# Patient Record
Sex: FEMALE | Race: WHITE | Employment: FULL TIME | URBAN - METROPOLITAN AREA
[De-identification: names, ages, dates, MRNs, and addresses within clinical notes are randomized per-mention and may not be internally consistent; named-entity substitution may affect disease eponyms.]

---

## 2017-01-24 ENCOUNTER — OFFICE VISIT (OUTPATIENT)
Dept: FAMILY MEDICINE CLINIC | Age: 42
End: 2017-01-24

## 2017-01-24 VITALS — DIASTOLIC BLOOD PRESSURE: 78 MMHG | SYSTOLIC BLOOD PRESSURE: 120 MMHG | WEIGHT: 146 LBS | BODY MASS INDEX: 23.57 KG/M2

## 2017-01-24 DIAGNOSIS — F32.4 MAJOR DEPRESSION SINGLE EPISODE, IN PARTIAL REMISSION (HCC): Primary | ICD-10-CM

## 2017-01-24 DIAGNOSIS — F41.9 ANXIETY: ICD-10-CM

## 2017-01-24 PROCEDURE — 99213 OFFICE O/P EST LOW 20 MIN: CPT | Performed by: FAMILY MEDICINE

## 2017-01-24 RX ORDER — SERTRALINE HYDROCHLORIDE 100 MG/1
TABLET, FILM COATED ORAL
Qty: 30 TABLET | Refills: 5 | Status: SHIPPED | OUTPATIENT
Start: 2017-01-24 | End: 2017-08-07 | Stop reason: SDUPTHER

## 2017-04-25 ENCOUNTER — OFFICE VISIT (OUTPATIENT)
Dept: FAMILY MEDICINE CLINIC | Age: 42
End: 2017-04-25

## 2017-04-25 VITALS
SYSTOLIC BLOOD PRESSURE: 118 MMHG | HEIGHT: 66 IN | WEIGHT: 144 LBS | DIASTOLIC BLOOD PRESSURE: 76 MMHG | BODY MASS INDEX: 23.14 KG/M2

## 2017-04-25 DIAGNOSIS — F41.9 ANXIETY: ICD-10-CM

## 2017-04-25 DIAGNOSIS — Z00.00 PREVENTATIVE HEALTH CARE: ICD-10-CM

## 2017-04-25 DIAGNOSIS — F32.4 MAJOR DEPRESSION SINGLE EPISODE, IN PARTIAL REMISSION (HCC): Primary | ICD-10-CM

## 2017-04-25 PROCEDURE — 99213 OFFICE O/P EST LOW 20 MIN: CPT | Performed by: FAMILY MEDICINE

## 2017-06-14 RX ORDER — ALPRAZOLAM 1 MG/1
TABLET ORAL
Qty: 42 TABLET | Refills: 0 | Status: SHIPPED | OUTPATIENT
Start: 2017-06-14 | End: 2017-07-13 | Stop reason: SDUPTHER

## 2017-07-25 ENCOUNTER — OFFICE VISIT (OUTPATIENT)
Dept: FAMILY MEDICINE CLINIC | Age: 42
End: 2017-07-25

## 2017-07-25 VITALS
SYSTOLIC BLOOD PRESSURE: 120 MMHG | HEIGHT: 66 IN | WEIGHT: 135 LBS | DIASTOLIC BLOOD PRESSURE: 74 MMHG | BODY MASS INDEX: 21.69 KG/M2

## 2017-07-25 DIAGNOSIS — F41.9 ANXIETY: Primary | ICD-10-CM

## 2017-07-25 DIAGNOSIS — Z00.00 PREVENTATIVE HEALTH CARE: ICD-10-CM

## 2017-07-25 PROCEDURE — 99213 OFFICE O/P EST LOW 20 MIN: CPT | Performed by: FAMILY MEDICINE

## 2017-08-08 RX ORDER — SERTRALINE HYDROCHLORIDE 100 MG/1
TABLET, FILM COATED ORAL
Qty: 30 TABLET | Refills: 2 | Status: SHIPPED | OUTPATIENT
Start: 2017-08-08 | End: 2017-10-13

## 2017-08-17 RX ORDER — ALPRAZOLAM 1 MG/1
TABLET ORAL
Qty: 42 TABLET | Refills: 0 | Status: SHIPPED | OUTPATIENT
Start: 2017-08-17 | End: 2017-09-22 | Stop reason: SDUPTHER

## 2017-10-16 NOTE — PROGRESS NOTES
Subjective:      Patient ID: Judith Chambers is a 43 y.o. female. HPI     Depression: Pt takes Zoloft 100 mg daily. She feels that the medication is helping with her symptoms. She is sleeping well (with OTC Unisom) and denies any suicidal ideation. She feels that she still needs to be on the medication.       Anxiety:  Pt takes Xanax 1 mg every 8 hrs as needed. She generally just takes it 1 times daily and feels that it works well when taken. Review of Systems  /78 (Site: Left Arm)   Ht 5' 6\" (1.676 m)   Wt 138 lb (62.6 kg)   BMI 22.27 kg/m²    Objective:   Physical Exam   Constitutional: She is oriented to person, place, and time. She appears well-developed and well-nourished. No distress. HENT:   Head: Normocephalic. Right Ear: External ear normal.   Left Ear: External ear normal.   Mouth/Throat: Oropharynx is clear and moist. No oropharyngeal exudate. Neck: No JVD present. No thyromegaly present. Cardiovascular: Normal rate, regular rhythm and normal heart sounds. No murmur heard. Pulmonary/Chest: Effort normal and breath sounds normal. She has no wheezes. She has no rales. Musculoskeletal: She exhibits no edema. Lymphadenopathy:     She has no cervical adenopathy. Neurological: She is alert and oriented to person, place, and time. Assessment:      Depression  Anxiety      Plan:      OARRS report was reviewed  Medications refilled   Flu Shot Given  Reminded patient to have a GYN exam  RTO 3 months for Anxiety    Controlled Substances Monitoring:     Attestation: The Prescription Monitoring Report for this patient was reviewed today. Jamison Calix DO)  Documentation: No signs of potential drug abuse or diversion identified., Existing medication contract.  Jamison Calix DO)

## 2017-10-17 ENCOUNTER — OFFICE VISIT (OUTPATIENT)
Dept: FAMILY MEDICINE CLINIC | Age: 42
End: 2017-10-17

## 2017-10-17 VITALS
WEIGHT: 138 LBS | SYSTOLIC BLOOD PRESSURE: 118 MMHG | HEIGHT: 66 IN | BODY MASS INDEX: 22.18 KG/M2 | DIASTOLIC BLOOD PRESSURE: 78 MMHG

## 2017-10-17 DIAGNOSIS — Z23 NEED FOR INFLUENZA VACCINATION: ICD-10-CM

## 2017-10-17 DIAGNOSIS — F41.9 ANXIETY: ICD-10-CM

## 2017-10-17 DIAGNOSIS — F32.4 MAJOR DEPRESSION SINGLE EPISODE, IN PARTIAL REMISSION (HCC): Primary | ICD-10-CM

## 2017-10-17 PROCEDURE — 90686 IIV4 VACC NO PRSV 0.5 ML IM: CPT | Performed by: FAMILY MEDICINE

## 2017-10-17 PROCEDURE — 99213 OFFICE O/P EST LOW 20 MIN: CPT | Performed by: FAMILY MEDICINE

## 2017-10-17 PROCEDURE — 90471 IMMUNIZATION ADMIN: CPT | Performed by: FAMILY MEDICINE

## 2017-10-17 NOTE — PROGRESS NOTES
Vaccine Information Sheet, \"Influenza - Inactivated\"  given to Jennifer Castro, or parent/legal guardian of  Jennifer Castro and verbalized understanding. Patient responses:    Have you ever had a reaction to a flu vaccine? No  Are you able to eat eggs without adverse effects? Yes  Do you have any current illness? No  Have you ever had Guillian [place] Syndrome? No    Flu vaccine given per order. Please see immunization tab.

## 2017-12-06 RX ORDER — ALPRAZOLAM 1 MG/1
TABLET ORAL
Qty: 42 TABLET | Refills: 0 | Status: SHIPPED | OUTPATIENT
Start: 2017-12-06 | End: 2018-01-11 | Stop reason: SDUPTHER

## 2018-01-11 RX ORDER — ALPRAZOLAM 1 MG/1
TABLET ORAL
Qty: 42 TABLET | Refills: 0 | Status: SHIPPED | OUTPATIENT
Start: 2018-01-11 | End: 2018-02-15 | Stop reason: SDUPTHER

## 2018-01-16 ENCOUNTER — OFFICE VISIT (OUTPATIENT)
Dept: FAMILY MEDICINE CLINIC | Age: 43
End: 2018-01-16

## 2018-01-16 VITALS
DIASTOLIC BLOOD PRESSURE: 74 MMHG | BODY MASS INDEX: 22.34 KG/M2 | WEIGHT: 139 LBS | HEIGHT: 66 IN | SYSTOLIC BLOOD PRESSURE: 118 MMHG

## 2018-01-16 DIAGNOSIS — F41.9 ANXIETY: Primary | ICD-10-CM

## 2018-01-16 PROCEDURE — 99213 OFFICE O/P EST LOW 20 MIN: CPT | Performed by: FAMILY MEDICINE

## 2018-02-15 RX ORDER — ALPRAZOLAM 1 MG/1
TABLET ORAL
Qty: 42 TABLET | Refills: 0 | Status: SHIPPED | OUTPATIENT
Start: 2018-02-15 | End: 2018-03-22 | Stop reason: SDUPTHER

## 2018-03-14 RX ORDER — SERTRALINE HYDROCHLORIDE 100 MG/1
TABLET, FILM COATED ORAL
Qty: 30 TABLET | Refills: 0 | Status: SHIPPED | OUTPATIENT
Start: 2018-03-14 | End: 2018-04-10 | Stop reason: SDUPTHER

## 2018-03-22 RX ORDER — ALPRAZOLAM 1 MG/1
TABLET ORAL
Qty: 42 TABLET | Refills: 0 | Status: SHIPPED | OUTPATIENT
Start: 2018-03-22 | End: 2018-04-23 | Stop reason: SDUPTHER

## 2018-04-17 ENCOUNTER — OFFICE VISIT (OUTPATIENT)
Dept: FAMILY MEDICINE CLINIC | Age: 43
End: 2018-04-17

## 2018-04-17 VITALS — BODY MASS INDEX: 23.24 KG/M2 | SYSTOLIC BLOOD PRESSURE: 120 MMHG | DIASTOLIC BLOOD PRESSURE: 76 MMHG | WEIGHT: 144 LBS

## 2018-04-17 DIAGNOSIS — F32.4 MAJOR DEPRESSION SINGLE EPISODE, IN PARTIAL REMISSION (HCC): Primary | ICD-10-CM

## 2018-04-17 DIAGNOSIS — Z00.00 PREVENTATIVE HEALTH CARE: ICD-10-CM

## 2018-04-17 DIAGNOSIS — F41.9 ANXIETY: ICD-10-CM

## 2018-04-17 LAB
ANION GAP SERPL CALCULATED.3IONS-SCNC: 17 MMOL/L (ref 3–16)
BUN BLDV-MCNC: 15 MG/DL (ref 7–20)
CALCIUM SERPL-MCNC: 9.4 MG/DL (ref 8.3–10.6)
CHLORIDE BLD-SCNC: 100 MMOL/L (ref 99–110)
CHOLESTEROL, TOTAL: 229 MG/DL (ref 0–199)
CO2: 24 MMOL/L (ref 21–32)
CREAT SERPL-MCNC: 0.6 MG/DL (ref 0.6–1.1)
GFR AFRICAN AMERICAN: >60
GFR NON-AFRICAN AMERICAN: >60
GLUCOSE BLD-MCNC: 81 MG/DL (ref 70–99)
HDLC SERPL-MCNC: 93 MG/DL (ref 40–60)
LDL CHOLESTEROL CALCULATED: 107 MG/DL
POTASSIUM SERPL-SCNC: 4.5 MMOL/L (ref 3.5–5.1)
SODIUM BLD-SCNC: 141 MMOL/L (ref 136–145)
TRIGL SERPL-MCNC: 143 MG/DL (ref 0–150)
VLDLC SERPL CALC-MCNC: 29 MG/DL

## 2018-04-17 PROCEDURE — 36415 COLL VENOUS BLD VENIPUNCTURE: CPT | Performed by: FAMILY MEDICINE

## 2018-04-17 PROCEDURE — 99214 OFFICE O/P EST MOD 30 MIN: CPT | Performed by: FAMILY MEDICINE

## 2018-04-17 ASSESSMENT — PATIENT HEALTH QUESTIONNAIRE - PHQ9
2. FEELING DOWN, DEPRESSED OR HOPELESS: 0
SUM OF ALL RESPONSES TO PHQ QUESTIONS 1-9: 0
SUM OF ALL RESPONSES TO PHQ9 QUESTIONS 1 & 2: 0
1. LITTLE INTEREST OR PLEASURE IN DOING THINGS: 0

## 2018-04-23 RX ORDER — ALPRAZOLAM 1 MG/1
TABLET ORAL
Qty: 42 TABLET | Refills: 0 | Status: SHIPPED | OUTPATIENT
Start: 2018-04-23 | End: 2018-05-23 | Stop reason: SDUPTHER

## 2018-05-24 RX ORDER — ALPRAZOLAM 1 MG/1
TABLET ORAL
Qty: 42 TABLET | Refills: 0 | Status: SHIPPED | OUTPATIENT
Start: 2018-05-24 | End: 2018-06-29 | Stop reason: SDUPTHER

## 2018-07-17 ENCOUNTER — OFFICE VISIT (OUTPATIENT)
Dept: FAMILY MEDICINE CLINIC | Age: 43
End: 2018-07-17

## 2018-07-17 VITALS
BODY MASS INDEX: 22.5 KG/M2 | DIASTOLIC BLOOD PRESSURE: 72 MMHG | WEIGHT: 140 LBS | SYSTOLIC BLOOD PRESSURE: 113 MMHG | HEIGHT: 66 IN | HEART RATE: 88 BPM

## 2018-07-17 DIAGNOSIS — F41.9 ANXIETY: Primary | ICD-10-CM

## 2018-07-17 PROCEDURE — 99213 OFFICE O/P EST LOW 20 MIN: CPT | Performed by: FAMILY MEDICINE

## 2018-08-06 DIAGNOSIS — F41.9 ANXIETY: ICD-10-CM

## 2018-08-06 RX ORDER — ALPRAZOLAM 1 MG/1
TABLET ORAL
Qty: 42 TABLET | Refills: 0 | Status: SHIPPED | OUTPATIENT
Start: 2018-08-06 | End: 2018-09-04 | Stop reason: SDUPTHER

## 2018-09-04 DIAGNOSIS — F41.9 ANXIETY: ICD-10-CM

## 2018-09-04 RX ORDER — ALPRAZOLAM 1 MG/1
TABLET ORAL
Qty: 42 TABLET | Refills: 0 | Status: SHIPPED | OUTPATIENT
Start: 2018-09-04 | End: 2018-10-02 | Stop reason: SDUPTHER

## 2018-10-02 DIAGNOSIS — F41.9 ANXIETY: ICD-10-CM

## 2018-10-02 RX ORDER — ALPRAZOLAM 1 MG/1
TABLET ORAL
Qty: 42 TABLET | Refills: 0 | Status: SHIPPED | OUTPATIENT
Start: 2018-10-02 | End: 2018-11-02 | Stop reason: SDUPTHER

## 2018-10-17 ENCOUNTER — OFFICE VISIT (OUTPATIENT)
Dept: FAMILY MEDICINE CLINIC | Age: 43
End: 2018-10-17
Payer: COMMERCIAL

## 2018-10-17 VITALS — DIASTOLIC BLOOD PRESSURE: 78 MMHG | WEIGHT: 146 LBS | SYSTOLIC BLOOD PRESSURE: 120 MMHG | BODY MASS INDEX: 23.57 KG/M2

## 2018-10-17 DIAGNOSIS — F32.4 MAJOR DEPRESSION SINGLE EPISODE, IN PARTIAL REMISSION (HCC): Primary | ICD-10-CM

## 2018-10-17 DIAGNOSIS — F41.9 ANXIETY: ICD-10-CM

## 2018-10-17 DIAGNOSIS — Z23 NEED FOR INFLUENZA VACCINATION: ICD-10-CM

## 2018-10-17 PROCEDURE — 90471 IMMUNIZATION ADMIN: CPT | Performed by: FAMILY MEDICINE

## 2018-10-17 PROCEDURE — 90686 IIV4 VACC NO PRSV 0.5 ML IM: CPT | Performed by: FAMILY MEDICINE

## 2018-10-17 PROCEDURE — 99213 OFFICE O/P EST LOW 20 MIN: CPT | Performed by: FAMILY MEDICINE

## 2018-11-02 DIAGNOSIS — F41.9 ANXIETY: ICD-10-CM

## 2018-11-02 RX ORDER — ALPRAZOLAM 1 MG/1
TABLET ORAL
Qty: 42 TABLET | Refills: 0 | Status: SHIPPED | OUTPATIENT
Start: 2018-11-02 | End: 2018-12-10 | Stop reason: SDUPTHER

## 2018-12-10 DIAGNOSIS — F41.9 ANXIETY: ICD-10-CM

## 2018-12-10 RX ORDER — ALPRAZOLAM 1 MG/1
TABLET ORAL
Qty: 42 TABLET | Refills: 0 | Status: SHIPPED | OUTPATIENT
Start: 2018-12-10 | End: 2019-01-17 | Stop reason: SDUPTHER

## 2018-12-10 RX ORDER — SERTRALINE HYDROCHLORIDE 100 MG/1
TABLET, FILM COATED ORAL
Qty: 30 TABLET | Refills: 0 | Status: SHIPPED | OUTPATIENT
Start: 2018-12-10 | End: 2019-01-02 | Stop reason: SDUPTHER

## 2019-01-17 ENCOUNTER — OFFICE VISIT (OUTPATIENT)
Dept: FAMILY MEDICINE CLINIC | Age: 44
End: 2019-01-17
Payer: COMMERCIAL

## 2019-01-17 VITALS — WEIGHT: 148 LBS | DIASTOLIC BLOOD PRESSURE: 80 MMHG | SYSTOLIC BLOOD PRESSURE: 120 MMHG | BODY MASS INDEX: 23.89 KG/M2

## 2019-01-17 DIAGNOSIS — F41.9 ANXIETY: ICD-10-CM

## 2019-01-17 PROCEDURE — 99213 OFFICE O/P EST LOW 20 MIN: CPT | Performed by: FAMILY MEDICINE

## 2019-01-17 RX ORDER — ALPRAZOLAM 1 MG/1
TABLET ORAL
Qty: 42 TABLET | Refills: 0 | Status: SHIPPED | OUTPATIENT
Start: 2019-01-17 | End: 2019-02-19 | Stop reason: SDUPTHER

## 2019-02-19 DIAGNOSIS — F41.9 ANXIETY: ICD-10-CM

## 2019-02-19 RX ORDER — ALPRAZOLAM 1 MG/1
TABLET ORAL
Qty: 42 TABLET | Refills: 0 | Status: SHIPPED | OUTPATIENT
Start: 2019-02-19 | End: 2019-03-18 | Stop reason: SDUPTHER

## 2019-03-18 DIAGNOSIS — F41.9 ANXIETY: ICD-10-CM

## 2019-03-18 RX ORDER — ALPRAZOLAM 1 MG/1
TABLET ORAL
Qty: 42 TABLET | Refills: 0 | Status: SHIPPED | OUTPATIENT
Start: 2019-03-18 | End: 2019-04-16 | Stop reason: SDUPTHER

## 2019-04-15 RX ORDER — SERTRALINE HYDROCHLORIDE 100 MG/1
TABLET, FILM COATED ORAL
Qty: 30 TABLET | Refills: 0 | Status: SHIPPED | OUTPATIENT
Start: 2019-04-15 | End: 2019-05-18 | Stop reason: SDUPTHER

## 2019-04-16 ENCOUNTER — OFFICE VISIT (OUTPATIENT)
Dept: FAMILY MEDICINE CLINIC | Age: 44
End: 2019-04-16
Payer: COMMERCIAL

## 2019-04-16 VITALS
WEIGHT: 145.6 LBS | BODY MASS INDEX: 23.4 KG/M2 | DIASTOLIC BLOOD PRESSURE: 70 MMHG | HEIGHT: 66 IN | SYSTOLIC BLOOD PRESSURE: 110 MMHG

## 2019-04-16 DIAGNOSIS — F41.9 ANXIETY: ICD-10-CM

## 2019-04-16 DIAGNOSIS — F32.4 MAJOR DEPRESSION SINGLE EPISODE, IN PARTIAL REMISSION (HCC): Primary | ICD-10-CM

## 2019-04-16 PROCEDURE — 99213 OFFICE O/P EST LOW 20 MIN: CPT | Performed by: FAMILY MEDICINE

## 2019-04-16 RX ORDER — ALPRAZOLAM 1 MG/1
TABLET ORAL
Qty: 42 TABLET | Refills: 0 | Status: SHIPPED | OUTPATIENT
Start: 2019-04-16 | End: 2019-05-14 | Stop reason: SDUPTHER

## 2019-05-14 DIAGNOSIS — F41.9 ANXIETY: ICD-10-CM

## 2019-05-14 RX ORDER — ALPRAZOLAM 1 MG/1
TABLET ORAL
Qty: 42 TABLET | Refills: 0 | Status: SHIPPED | OUTPATIENT
Start: 2019-05-14 | End: 2019-06-10 | Stop reason: SDUPTHER

## 2019-06-10 DIAGNOSIS — F41.9 ANXIETY: ICD-10-CM

## 2019-06-10 RX ORDER — ALPRAZOLAM 1 MG/1
TABLET ORAL
Qty: 42 TABLET | Refills: 0 | Status: SHIPPED | OUTPATIENT
Start: 2019-06-10 | End: 2019-07-08 | Stop reason: SDUPTHER

## 2019-07-08 DIAGNOSIS — F41.9 ANXIETY: ICD-10-CM

## 2019-07-08 RX ORDER — ALPRAZOLAM 1 MG/1
TABLET ORAL
Qty: 42 TABLET | Refills: 0 | Status: SHIPPED | OUTPATIENT
Start: 2019-07-08 | End: 2019-07-30 | Stop reason: SDUPTHER

## 2019-07-16 ENCOUNTER — OFFICE VISIT (OUTPATIENT)
Dept: FAMILY MEDICINE CLINIC | Age: 44
End: 2019-07-16
Payer: COMMERCIAL

## 2019-07-16 VITALS
BODY MASS INDEX: 23.4 KG/M2 | WEIGHT: 145 LBS | HEART RATE: 92 BPM | DIASTOLIC BLOOD PRESSURE: 78 MMHG | OXYGEN SATURATION: 96 % | TEMPERATURE: 98.2 F | SYSTOLIC BLOOD PRESSURE: 116 MMHG

## 2019-07-16 DIAGNOSIS — F32.4 MAJOR DEPRESSION SINGLE EPISODE, IN PARTIAL REMISSION (HCC): Primary | ICD-10-CM

## 2019-07-16 DIAGNOSIS — F41.9 ANXIETY: ICD-10-CM

## 2019-07-16 PROCEDURE — 99213 OFFICE O/P EST LOW 20 MIN: CPT | Performed by: FAMILY MEDICINE

## 2019-07-30 DIAGNOSIS — F41.9 ANXIETY: ICD-10-CM

## 2019-07-30 RX ORDER — ALPRAZOLAM 1 MG/1
TABLET ORAL
Qty: 42 TABLET | Refills: 0 | Status: SHIPPED | OUTPATIENT
Start: 2019-07-30 | End: 2019-08-26 | Stop reason: SDUPTHER

## 2019-08-26 DIAGNOSIS — F41.9 ANXIETY: ICD-10-CM

## 2019-08-26 RX ORDER — ALPRAZOLAM 1 MG/1
TABLET ORAL
Qty: 42 TABLET | Refills: 0 | Status: SHIPPED | OUTPATIENT
Start: 2019-08-26 | End: 2019-09-23 | Stop reason: SDUPTHER

## 2019-09-23 DIAGNOSIS — F41.9 ANXIETY: ICD-10-CM

## 2019-09-23 RX ORDER — ALPRAZOLAM 1 MG/1
TABLET ORAL
Qty: 42 TABLET | Refills: 0 | Status: SHIPPED | OUTPATIENT
Start: 2019-09-23 | End: 2019-10-22 | Stop reason: SDUPTHER

## 2019-10-22 ENCOUNTER — OFFICE VISIT (OUTPATIENT)
Dept: FAMILY MEDICINE CLINIC | Age: 44
End: 2019-10-22
Payer: COMMERCIAL

## 2019-10-22 VITALS
WEIGHT: 147 LBS | BODY MASS INDEX: 23.63 KG/M2 | SYSTOLIC BLOOD PRESSURE: 114 MMHG | HEIGHT: 66 IN | DIASTOLIC BLOOD PRESSURE: 80 MMHG

## 2019-10-22 DIAGNOSIS — F41.9 ANXIETY: Primary | ICD-10-CM

## 2019-10-22 DIAGNOSIS — Z23 NEED FOR INFLUENZA VACCINATION: ICD-10-CM

## 2019-10-22 PROCEDURE — 90471 IMMUNIZATION ADMIN: CPT | Performed by: FAMILY MEDICINE

## 2019-10-22 PROCEDURE — 90686 IIV4 VACC NO PRSV 0.5 ML IM: CPT | Performed by: FAMILY MEDICINE

## 2019-10-22 PROCEDURE — 99213 OFFICE O/P EST LOW 20 MIN: CPT | Performed by: FAMILY MEDICINE

## 2019-10-22 RX ORDER — ALPRAZOLAM 1 MG/1
1 TABLET ORAL 3 TIMES DAILY PRN
Qty: 42 TABLET | Refills: 0 | Status: SHIPPED | OUTPATIENT
Start: 2019-10-22 | End: 2019-11-14 | Stop reason: SDUPTHER

## 2019-12-06 DIAGNOSIS — F41.9 ANXIETY: ICD-10-CM

## 2019-12-06 RX ORDER — ALPRAZOLAM 1 MG/1
TABLET ORAL
Qty: 42 TABLET | Refills: 0 | Status: SHIPPED | OUTPATIENT
Start: 2019-12-06 | End: 2020-01-02

## 2020-01-02 RX ORDER — ALPRAZOLAM 1 MG/1
TABLET ORAL
Qty: 42 TABLET | Refills: 0 | Status: SHIPPED | OUTPATIENT
Start: 2020-01-02 | End: 2020-01-28

## 2020-01-17 RX ORDER — SERTRALINE HYDROCHLORIDE 100 MG/1
TABLET, FILM COATED ORAL
Qty: 30 TABLET | Refills: 2 | Status: SHIPPED | OUTPATIENT
Start: 2020-01-17 | End: 2020-04-08

## 2020-01-20 ENCOUNTER — OFFICE VISIT (OUTPATIENT)
Dept: FAMILY MEDICINE CLINIC | Age: 45
End: 2020-01-20
Payer: COMMERCIAL

## 2020-01-20 VITALS
HEIGHT: 66 IN | BODY MASS INDEX: 25.07 KG/M2 | WEIGHT: 156 LBS | SYSTOLIC BLOOD PRESSURE: 118 MMHG | DIASTOLIC BLOOD PRESSURE: 78 MMHG

## 2020-01-20 PROCEDURE — 99213 OFFICE O/P EST LOW 20 MIN: CPT | Performed by: FAMILY MEDICINE

## 2020-02-19 RX ORDER — ALPRAZOLAM 1 MG/1
TABLET ORAL
Qty: 42 TABLET | Refills: 0 | Status: SHIPPED | OUTPATIENT
Start: 2020-02-19 | End: 2020-03-12

## 2020-03-12 RX ORDER — ALPRAZOLAM 1 MG/1
TABLET ORAL
Qty: 42 TABLET | Refills: 0 | Status: SHIPPED | OUTPATIENT
Start: 2020-03-12 | End: 2020-04-14 | Stop reason: SDUPTHER

## 2020-04-14 ENCOUNTER — TELEMEDICINE (OUTPATIENT)
Dept: FAMILY MEDICINE CLINIC | Age: 45
End: 2020-04-14
Payer: COMMERCIAL

## 2020-04-14 PROCEDURE — 99213 OFFICE O/P EST LOW 20 MIN: CPT | Performed by: FAMILY MEDICINE

## 2020-04-14 RX ORDER — ALPRAZOLAM 1 MG/1
TABLET ORAL
Qty: 42 TABLET | Refills: 0 | Status: SHIPPED | OUTPATIENT
Start: 2020-04-14 | End: 2020-05-19

## 2020-05-19 RX ORDER — ALPRAZOLAM 1 MG/1
TABLET ORAL
Qty: 42 TABLET | Refills: 0 | Status: SHIPPED | OUTPATIENT
Start: 2020-05-19 | End: 2020-06-15

## 2020-06-15 RX ORDER — ALPRAZOLAM 1 MG/1
TABLET ORAL
Qty: 42 TABLET | Refills: 0 | Status: SHIPPED | OUTPATIENT
Start: 2020-06-15 | End: 2020-07-14 | Stop reason: SDUPTHER

## 2020-07-13 NOTE — PROGRESS NOTES
Subjective:      Patient ID: Rudy Langley is a 39 y.o. female. HPI TELEHEALTH EVALUATION -- Audio/Visual (During DZQEP-47 public health emergency)     Pursuant to the emergency declaration under the [address] [place] [address] and the [employer] Act, this Virtual  Visit was conducted, with patient's consent, to reduce the patient's risk of exposure to COVID-19 and provide continuity of care for an established patient. Services were provided through a video synchronous discussion virtually to substitute for in-person clinic visit. Rudy Langley is a 39 y.o. female being evaluated by a Virtual Visit (video visit) encounter to address concerns as mentioned above. A caregiver was present when appropriate. Due to this being a TeleHealth encounter (During MZZNB-89 public health emergency), evaluation of the following organ systems was limited: Vitals/Constitutional/EENT/Resp/CV/GI//MS/Neuro/Skin/Heme-Lymph-Imm. Pursuant to the emergency declaration under the [address] [place] [address] and the [employer] Act, this Virtual Visit was conducted with patient's (and/or legal guardian's) consent, to reduce the patient's risk of exposure to COVID-19 and provide necessary medical care. The patient (and/or legal guardian) has also been advised to contact this office for worsening conditions or problems, and seek emergency medical treatment and/or call 911 if deemed necessary. Services were provided through a video synchronous discussion virtually to substitute for in-person clinic visit. Patient and provider were located at their individual homes. --MILVIA PELAYO DO on 7/13/2020 at 6:00 AM    An electronic signature was used to authenticate this note.       Depression: Patient takes Zoloft 100 mg daily. Juan Giron

## 2020-07-14 ENCOUNTER — TELEMEDICINE (OUTPATIENT)
Dept: FAMILY MEDICINE CLINIC | Age: 45
End: 2020-07-14
Payer: COMMERCIAL

## 2020-07-14 PROCEDURE — 99213 OFFICE O/P EST LOW 20 MIN: CPT | Performed by: FAMILY MEDICINE

## 2020-07-14 RX ORDER — SERTRALINE HYDROCHLORIDE 100 MG/1
TABLET, FILM COATED ORAL
Qty: 30 TABLET | Refills: 5 | Status: SHIPPED | OUTPATIENT
Start: 2020-07-14 | End: 2020-10-13 | Stop reason: SDUPTHER

## 2020-07-14 RX ORDER — ALPRAZOLAM 1 MG/1
1 TABLET ORAL 3 TIMES DAILY PRN
Qty: 42 TABLET | Refills: 0 | Status: SHIPPED | OUTPATIENT
Start: 2020-07-14 | End: 2020-08-17

## 2020-08-17 RX ORDER — ALPRAZOLAM 1 MG/1
TABLET ORAL
Qty: 42 TABLET | Refills: 0 | Status: SHIPPED | OUTPATIENT
Start: 2020-08-17 | End: 2020-09-14

## 2020-09-14 RX ORDER — ALPRAZOLAM 1 MG/1
TABLET ORAL
Qty: 42 TABLET | Refills: 0 | Status: SHIPPED | OUTPATIENT
Start: 2020-09-14 | End: 2020-10-13 | Stop reason: SDUPTHER

## 2020-10-12 NOTE — PROGRESS NOTES
Subjective:      Patient ID: Dustin Orta is a 39 y.o. female. HPI     Depression: Patient takes Zoloft 100 mg daily.  She feels that the medication is helping with her symptoms but has been somewhat more depressed over the last couple weeks. Be Valencia is sleeping well (with OTC Unisom) and denies any suicidal ideation.  She feels that she still needs to be on the medication.        Anxiety:  Patient takes Xanax 1 mg every 8 hrs as needed.  She generally just takes it 1 time daily and feels that it works well when taken.      Review of Systems   Constitutional: Negative for chills and fever. Psychiatric/Behavioral: Positive for dysphoric mood. Negative for agitation, decreased concentration, sleep disturbance and suicidal ideas. The patient is nervous/anxious. /70 (Site: Left Upper Arm)   Temp 98.4 °F (36.9 °C) (Infrared)   Ht 5' 6\" (1.676 m)   Wt 158 lb (71.7 kg)   BMI 25.50 kg/m²    Objective:   Physical Exam  Constitutional:       General: She is not in acute distress. Appearance: She is well-developed. HENT:      Head: Normocephalic. Right Ear: External ear normal.      Left Ear: External ear normal.      Mouth/Throat:      Pharynx: No oropharyngeal exudate. Neck:      Thyroid: No thyromegaly. Vascular: No JVD. Cardiovascular:      Rate and Rhythm: Normal rate and regular rhythm. Heart sounds: Normal heart sounds. No murmur. Pulmonary:      Effort: Pulmonary effort is normal.      Breath sounds: Normal breath sounds. No wheezing or rales. Lymphadenopathy:      Cervical: No cervical adenopathy. Neurological:      Mental Status: She is alert and oriented to person, place, and time. Assessment:      Depression   Anxiety       Plan:       Increase the Zoloft to 150 mg daily.     OARRS report was reviewed  Medications refilled   Flu Shot Given  Reminded patient to have a GYN exam  RTO 3 months for Depression / Anxiety       Controlled Substance Monitoring:    Acute and Chronic Pain Monitoring:   RX Monitoring 10/13/2020   Attestation -   Periodic Controlled Substance Monitoring No signs of potential drug abuse or diversion identified.               [id number] Ros[address], DO

## 2020-10-13 ENCOUNTER — OFFICE VISIT (OUTPATIENT)
Dept: FAMILY MEDICINE CLINIC | Age: 45
End: 2020-10-13
Payer: COMMERCIAL

## 2020-10-13 VITALS
TEMPERATURE: 98.4 F | WEIGHT: 158 LBS | HEIGHT: 66 IN | DIASTOLIC BLOOD PRESSURE: 70 MMHG | BODY MASS INDEX: 25.39 KG/M2 | SYSTOLIC BLOOD PRESSURE: 110 MMHG

## 2020-10-13 PROCEDURE — 99213 OFFICE O/P EST LOW 20 MIN: CPT | Performed by: FAMILY MEDICINE

## 2020-10-13 PROCEDURE — 90471 IMMUNIZATION ADMIN: CPT | Performed by: FAMILY MEDICINE

## 2020-10-13 PROCEDURE — 90686 IIV4 VACC NO PRSV 0.5 ML IM: CPT | Performed by: FAMILY MEDICINE

## 2020-10-13 RX ORDER — SERTRALINE HYDROCHLORIDE 100 MG/1
150 TABLET, FILM COATED ORAL DAILY
Qty: 45 TABLET | Refills: 5 | Status: SHIPPED | OUTPATIENT
Start: 2020-10-13 | End: 2021-01-11 | Stop reason: SDUPTHER

## 2020-10-13 RX ORDER — ALPRAZOLAM 1 MG/1
TABLET ORAL
Qty: 42 TABLET | Refills: 0 | Status: SHIPPED | OUTPATIENT
Start: 2020-10-13 | End: 2020-11-09

## 2020-10-13 NOTE — PROGRESS NOTES
Vaccine Information Sheet, \"Influenza - Inactivated\"  given to Daija Ross, or parent/legal guardian of  Daija Ross and verbalized understanding. Patient responses:    Have you ever had a reaction to a flu vaccine? No  Do you have any current illness? No  Have you ever had Guillian [place] Syndrome? No  Do you have a serious allergy to any of the follow: Neomycin, Polymyxin, Thimerosal, eggs or egg products? No    Flu vaccine given per order. Please see immunization tab. Risks and benefits explained. Current VIS given.

## 2020-11-09 RX ORDER — ALPRAZOLAM 1 MG/1
TABLET ORAL
Qty: 42 TABLET | Refills: 0 | Status: SHIPPED | OUTPATIENT
Start: 2020-11-09 | End: 2020-12-03

## 2020-12-03 RX ORDER — ALPRAZOLAM 1 MG/1
TABLET ORAL
Qty: 42 TABLET | Refills: 0 | Status: SHIPPED | OUTPATIENT
Start: 2020-12-03 | End: 2020-12-30 | Stop reason: SDUPTHER

## 2020-12-30 RX ORDER — ALPRAZOLAM 1 MG/1
1 TABLET ORAL 3 TIMES DAILY PRN
Qty: 42 TABLET | Refills: 0 | Status: SHIPPED | OUTPATIENT
Start: 2020-12-30 | End: 2021-01-29 | Stop reason: SDUPTHER

## 2021-01-11 RX ORDER — SERTRALINE HYDROCHLORIDE 100 MG/1
150 TABLET, FILM COATED ORAL DAILY
Qty: 45 TABLET | Refills: 0 | Status: SHIPPED | OUTPATIENT
Start: 2021-01-11 | End: 2021-02-11

## 2021-01-12 NOTE — PROGRESS NOTES
Subjective:      Patient ID: Mimi Boxer is a 39 y.o. female. HPI  TELEHEALTH EVALUATION -- Audio/Visual (During JMJH-77 public health emergency)     Pursuant to the emergency declaration under the [address] [place] [address] and the [employer] Act, this Virtual  Visit was conducted, with patient's consent, to reduce the patient's risk of exposure to COVID-19 and provide continuity of care for an established patient. Services were provided through a video synchronous discussion virtually to substitute for in-person clinic visit. Mimi Boxer is a 39 y.o. female being evaluated by a Virtual Visit (video visit) encounter to address concerns as mentioned above. A caregiver was present when appropriate. Due to this being a TeleHealth encounter (During [facility]-45 public health emergency), evaluation of the following organ systems was limited: Vitals/Constitutional/EENT/Resp/CV/GI//MS/Neuro/Skin/Heme-Lymph-Imm. Pursuant to the emergency declaration under the [address] [place] [address], [address] [place] [address] and the [employer] Act, this Virtual Visit was conducted with patient's (and/or legal guardian's) consent, to reduce the patient's risk of exposure to COVID-19 and provide necessary medical care. The patient (and/or legal guardian) has also been advised to contact this office for worsening conditions or problems, and seek emergency medical treatment and/or call 911 if deemed necessary. Services were provided through a video synchronous discussion virtually to substitute for in-person clinic visit. Patient and provider were located at their individual homes. --Garth Mcnulty DO on 1/12/2021 at 5:37 AM    An electronic signature was used to authenticate this note.        Depression: Patient takes Zoloft 150 mg daily. Keene Hodgkin

## 2021-01-13 ENCOUNTER — VIRTUAL VISIT (OUTPATIENT)
Dept: FAMILY MEDICINE CLINIC | Age: 46
End: 2021-01-13
Payer: COMMERCIAL

## 2021-01-13 DIAGNOSIS — F32.4 MAJOR DEPRESSION SINGLE EPISODE, IN PARTIAL REMISSION (HCC): Primary | ICD-10-CM

## 2021-01-13 DIAGNOSIS — F41.9 ANXIETY: ICD-10-CM

## 2021-01-13 PROCEDURE — 99213 OFFICE O/P EST LOW 20 MIN: CPT | Performed by: FAMILY MEDICINE

## 2021-01-29 DIAGNOSIS — F41.9 ANXIETY: ICD-10-CM

## 2021-01-29 RX ORDER — ALPRAZOLAM 1 MG/1
1 TABLET ORAL 3 TIMES DAILY PRN
Qty: 42 TABLET | Refills: 0 | Status: SHIPPED | OUTPATIENT
Start: 2021-01-29 | End: 2021-02-22

## 2021-02-22 DIAGNOSIS — F41.9 ANXIETY: ICD-10-CM

## 2021-02-22 RX ORDER — ALPRAZOLAM 1 MG/1
TABLET ORAL
Qty: 42 TABLET | Refills: 0 | Status: SHIPPED | OUTPATIENT
Start: 2021-02-22 | End: 2021-03-15

## 2021-03-15 DIAGNOSIS — F41.9 ANXIETY: ICD-10-CM

## 2021-03-15 RX ORDER — ALPRAZOLAM 1 MG/1
TABLET ORAL
Qty: 42 TABLET | Refills: 0 | Status: SHIPPED | OUTPATIENT
Start: 2021-03-15 | End: 2021-04-09

## 2021-04-09 DIAGNOSIS — F41.9 ANXIETY: ICD-10-CM

## 2021-04-09 RX ORDER — ALPRAZOLAM 1 MG/1
TABLET ORAL
Qty: 42 TABLET | Refills: 0 | Status: SHIPPED | OUTPATIENT
Start: 2021-04-09 | End: 2021-05-03

## 2021-04-26 ENCOUNTER — VIRTUAL VISIT (OUTPATIENT)
Dept: FAMILY MEDICINE CLINIC | Age: 46
End: 2021-04-26
Payer: COMMERCIAL

## 2021-04-26 DIAGNOSIS — F41.9 ANXIETY: ICD-10-CM

## 2021-04-26 DIAGNOSIS — F32.4 MAJOR DEPRESSION SINGLE EPISODE, IN PARTIAL REMISSION (HCC): Primary | ICD-10-CM

## 2021-04-26 PROCEDURE — 99213 OFFICE O/P EST LOW 20 MIN: CPT | Performed by: FAMILY MEDICINE

## 2021-04-26 RX ORDER — SERTRALINE HYDROCHLORIDE 100 MG/1
TABLET, FILM COATED ORAL
Qty: 45 TABLET | Refills: 5 | Status: SHIPPED | OUTPATIENT
Start: 2021-04-26 | End: 2021-11-22 | Stop reason: SDUPTHER

## 2021-04-26 NOTE — PROGRESS NOTES
Subjective:      Patient ID: Fernandez Ralph is a 55 y.o. female. HPITELEHEALTH EVALUATION -- Audio/Visual (During DEJUD-41 public health emergency)     Pursuant to the emergency declaration under the [address] [place] [address] and the [employer] Act, this Virtual  Visit was conducted, with patient's consent, to reduce the patient's risk of exposure to COVID-19 and provide continuity of care for an established patient. Services were provided through a video synchronous discussion virtually to substitute for in-person clinic visit. Fernandez Ralph is a 55 y.o. female being evaluated by a Virtual Visit (video visit) encounter to address concerns as mentioned above. A caregiver was present when appropriate. Due to this being a TeleHealth encounter (During LDUBZ-31 public [facility] emergency), evaluation of the following organ systems was limited: Vitals/Constitutional/EENT/Resp/CV/GI//MS/Neuro/Skin/Heme-Lymph-Imm. Pursuant to the emergency declaration under the [address] [place] [address], [address] [place] [address] and the [employer] and [employer] Act, this Virtual Visit was conducted with patient's (and/or legal guardian's) consent, to reduce the patient's risk of exposure to COVID-19 and provide necessary medical care. The patient (and/or legal guardian) has also been advised to contact this office for worsening conditions or problems, and seek emergency medical treatment and/or call 911 if deemed necessary. Services were provided through a video synchronous discussion virtually to substitute for in-person clinic visit. Patient and provider were located at their individual homes. --Corrinne Hackney, DO on 4/25/2021 at 8:39 PM    An electronic signature was used to authenticate this note.        Depression: Patient takes Zoloft 150 mg daily. Santo Allan feels that the medication is helping with her symptoms. Sachin Brooks is sleeping well (with OTC Unisom) and denies any suicidal ideation.  She feels that she still needs to be on the medication.       Anxiety:  Patient takes Xanax 1 mg every 8 hrs as needed.  She generally just takes it 1 time daily and feels that it works well when taken.      Review of Systems   Constitutional: Negative for chills and fever. Psychiatric/Behavioral: Positive for dysphoric mood. Negative for agitation, decreased concentration, sleep disturbance and suicidal ideas. The patient is nervous/anxious. There were no vitals taken for this visit. Objective:   Physical Exam  Constitutional:       General: She is not in acute distress. Appearance: Normal appearance. She is normal weight. She is not ill-appearing or toxic-appearing. HENT:      Head: Normocephalic. Pulmonary:      Effort: Pulmonary effort is normal.   Neurological:      Mental Status: She is alert and oriented to person, place, and time. Psychiatric:         Mood and Affect: Mood normal.         Behavior: Behavior normal.         Thought Content: Thought content normal.         Judgment: Judgment normal.         Assessment:      Depression   Anxiety       Plan:       OARRS report was reviewed  Medications refilled   Reminded patient to have a GYN exam  RTO 3 months for Depression / Anxiety         Controlled Substance Monitoring:    Acute and Chronic Pain Monitoring:   RX Monitoring 4/26/2021   Attestation -   Periodic Controlled Substance Monitoring No signs of potential drug abuse or diversion identified.                   Jenny Patel DO

## 2021-05-03 DIAGNOSIS — F41.9 ANXIETY: ICD-10-CM

## 2021-05-03 RX ORDER — ALPRAZOLAM 1 MG/1
TABLET ORAL
Qty: 42 TABLET | Refills: 0 | Status: SHIPPED | OUTPATIENT
Start: 2021-05-03 | End: 2021-05-24

## 2021-05-24 DIAGNOSIS — F41.9 ANXIETY: ICD-10-CM

## 2021-05-24 RX ORDER — ALPRAZOLAM 1 MG/1
TABLET ORAL
Qty: 42 TABLET | Refills: 0 | Status: SHIPPED | OUTPATIENT
Start: 2021-05-24 | End: 2021-06-16

## 2021-06-16 DIAGNOSIS — F41.9 ANXIETY: ICD-10-CM

## 2021-06-16 RX ORDER — ALPRAZOLAM 1 MG/1
TABLET ORAL
Qty: 42 TABLET | Refills: 0 | Status: SHIPPED | OUTPATIENT
Start: 2021-06-16 | End: 2021-07-07

## 2021-07-07 DIAGNOSIS — F41.9 ANXIETY: ICD-10-CM

## 2021-07-07 RX ORDER — ALPRAZOLAM 1 MG/1
TABLET ORAL
Qty: 42 TABLET | Refills: 0 | Status: SHIPPED | OUTPATIENT
Start: 2021-07-07 | End: 2021-07-29

## 2021-07-14 NOTE — PROGRESS NOTES
Subjective:      Patient ID: Dustin Orta is a 55 y.o. female. HPITELEHEALTH EVALUATION -- Audio/Visual (During QFVTG-84 public health emergency)     Pursuant to the emergency declaration under the [address] [place] [address] and the [employer] Act, this Virtual  Visit was conducted, with patient's consent, to reduce the patient's risk of exposure to COVID-19 and provide continuity of care for an established patient. Services were provided through a video synchronous discussion virtually to substitute for in-person clinic visit. Dustin Orta is a 55 y.o. female being evaluated by a Virtual Visit (video visit) encounter to address concerns as mentioned above. A caregiver was present when appropriate. Due to this being a TeleHealth encounter (During SXJFB-08 public health emergency), evaluation of the following organ systems was limited: Vitals/Constitutional/EENT/Resp/CV/GI//MS/Neuro/Skin/Heme-Lymph-Imm. Pursuant to the emergency declaration under the [address] [place] [address] and the [employer] Act, this Virtual Visit was conducted with patient's (and/or legal guardian's) consent, to reduce the patient's risk of exposure to COVID-19 and provide necessary medical care. The patient (and/or legal guardian) has also been advised to contact this office for worsening conditions or problems, and seek emergency medical treatment and/or call 911 if deemed necessary. Services were provided through a video synchronous discussion virtually to substitute for in-person clinic visit. Patient and provider were located at their individual homes. --MILVIA PELAYO DO on 7/14/2021 at 10:30 AM    An electronic signature was used to authenticate this note.        Depression: Patient takes Zoloft 150 mg daily. Be Valencia feels that the medication is helping with her symptoms. Silvina Zafar is sleeping well (with OTC Unisom) and denies any suicidal ideation.  She feels that she still needs to be on the medication.       Anxiety:  Patient takes Xanax 1 mg every 8 hrs as needed.  She generally just takes it 1-2 times daily and feels that it works well when taken.      Review of Systems   Constitutional: Negative for chills and fever. Psychiatric/Behavioral: Positive for dysphoric mood. Negative for agitation, decreased concentration, sleep disturbance and suicidal ideas. The patient is nervous/anxious. There were no vitals taken for this visit. Objective:   Physical Exam  Constitutional:       General: She is not in acute distress. Appearance: Normal appearance. She is normal weight. She is not ill-appearing or toxic-appearing. HENT:      Head: Normocephalic. Pulmonary:      Effort: Pulmonary effort is normal.   Neurological:      Mental Status: She is alert and oriented to person, place, and time. Psychiatric:         Mood and Affect: Mood normal.         Behavior: Behavior normal.         Thought Content: Thought content normal.         Judgment: Judgment normal.         Assessment:      Depression   Anxiety       Plan:       OARRS report was reviewed  Will refill Xanax when needed   Reminded patient to have a GYN exam  RTO 3 months for Depression / Anxiety         Controlled Substance Monitoring:    Acute and Chronic Pain Monitoring:   RX Monitoring 7/15/2021   Attestation -   Periodic Controlled Substance Monitoring No signs of potential drug abuse or diversion identified.                  [id number] Ros[address] Richmond DO

## 2021-07-15 ENCOUNTER — VIRTUAL VISIT (OUTPATIENT)
Dept: FAMILY MEDICINE CLINIC | Age: 46
End: 2021-07-15
Payer: COMMERCIAL

## 2021-07-15 DIAGNOSIS — F32.4 MAJOR DEPRESSION SINGLE EPISODE, IN PARTIAL REMISSION (HCC): Primary | ICD-10-CM

## 2021-07-15 DIAGNOSIS — F41.9 ANXIETY: ICD-10-CM

## 2021-07-15 PROCEDURE — 99213 OFFICE O/P EST LOW 20 MIN: CPT | Performed by: FAMILY MEDICINE

## 2021-07-29 DIAGNOSIS — F41.9 ANXIETY: ICD-10-CM

## 2021-07-29 RX ORDER — ALPRAZOLAM 1 MG/1
TABLET ORAL
Qty: 90 TABLET | Refills: 0 | Status: SHIPPED | OUTPATIENT
Start: 2021-07-29 | End: 2021-09-15

## 2021-08-25 ENCOUNTER — OFFICE VISIT (OUTPATIENT)
Dept: FAMILY MEDICINE CLINIC | Age: 46
End: 2021-08-25
Payer: COMMERCIAL

## 2021-08-25 ENCOUNTER — TELEPHONE (OUTPATIENT)
Dept: FAMILY MEDICINE CLINIC | Age: 46
End: 2021-08-25

## 2021-08-25 VITALS
RESPIRATION RATE: 16 BRPM | DIASTOLIC BLOOD PRESSURE: 80 MMHG | HEART RATE: 85 BPM | OXYGEN SATURATION: 98 % | SYSTOLIC BLOOD PRESSURE: 130 MMHG | HEIGHT: 66 IN | BODY MASS INDEX: 25.55 KG/M2 | WEIGHT: 159 LBS

## 2021-08-25 DIAGNOSIS — H61.23 BILATERAL HEARING LOSS DUE TO CERUMEN IMPACTION: Primary | ICD-10-CM

## 2021-08-25 PROCEDURE — 69210 REMOVE IMPACTED EAR WAX UNI: CPT | Performed by: FAMILY MEDICINE

## 2021-08-25 SDOH — ECONOMIC STABILITY: FOOD INSECURITY: WITHIN THE PAST 12 MONTHS, THE FOOD YOU BOUGHT JUST DIDN'T LAST AND YOU DIDN'T HAVE MONEY TO GET MORE.: NEVER TRUE

## 2021-08-25 SDOH — ECONOMIC STABILITY: FOOD INSECURITY: WITHIN THE PAST 12 MONTHS, YOU WORRIED THAT YOUR FOOD WOULD RUN OUT BEFORE YOU GOT MONEY TO BUY MORE.: NEVER TRUE

## 2021-08-25 ASSESSMENT — SOCIAL DETERMINANTS OF HEALTH (SDOH): HOW HARD IS IT FOR YOU TO PAY FOR THE VERY BASICS LIKE FOOD, HOUSING, MEDICAL CARE, AND HEATING?: NOT HARD AT ALL

## 2021-08-25 NOTE — TELEPHONE ENCOUNTER
Patient states her right ear is clogged up and needs to be cleaned out. States this started a week ago while she was on vacation. Patient requesting an appointment. Please advise.

## 2021-08-25 NOTE — PROGRESS NOTES
Subjective:      Patient ID: Alejandrina Allan is a 55 y.o. female. HPI     Clogged Ears:  Patient was recently on vacation in [place]. She has had a clogged right ear for 1 week. She denies any pain. She denies any bleeding or drainage from the ear. Review of Systems   Constitutional: Negative for chills and fever. HENT: Positive for hearing loss. /80   Pulse 85   Resp 16   Ht 5' 6\" (1.676 m)   Wt 159 lb (72.1 kg)   LMP 08/03/2021   SpO2 98%   BMI 25.66 kg/m²    Objective:   Physical Exam  Constitutional:       General: She is not in acute distress. Appearance: She is well-developed. HENT:      Head: Normocephalic. Right Ear: External ear normal.      Left Ear: External ear normal.      Ears:      Comments: Bilateral cerumen impactions. TM's normal post lavage. Mouth/Throat:      Pharynx: No oropharyngeal exudate. Neck:      Thyroid: No thyromegaly. Vascular: No JVD. Cardiovascular:      Rate and Rhythm: Normal rate and regular rhythm. Heart sounds: Normal heart sounds. No murmur heard. Pulmonary:      Effort: Pulmonary effort is normal.      Breath sounds: Normal breath sounds. No wheezing or rales. Lymphadenopathy:      Cervical: No cervical adenopathy. Neurological:      Mental Status: She is alert and oriented to person, place, and time. Assessment:      Bilateral Cerumen Impaction       Plan:      I lavaged each ear and removed was with a curette.    Reminded patient to have a GYN exam  RTO 2 months for Depression / [id number] [facility],

## 2021-09-15 DIAGNOSIS — F41.9 ANXIETY: ICD-10-CM

## 2021-09-15 RX ORDER — ALPRAZOLAM 1 MG/1
1 TABLET ORAL 3 TIMES DAILY PRN
Qty: 90 TABLET | Refills: 0 | Status: SHIPPED | OUTPATIENT
Start: 2021-09-15 | End: 2021-10-21

## 2021-10-21 DIAGNOSIS — F41.9 ANXIETY: ICD-10-CM

## 2021-10-21 RX ORDER — ALPRAZOLAM 1 MG/1
TABLET ORAL
Qty: 90 TABLET | Refills: 0 | Status: SHIPPED | OUTPATIENT
Start: 2021-10-21 | End: 2021-11-22 | Stop reason: SDUPTHER

## 2021-11-18 NOTE — PROGRESS NOTES
Subjective:      Patient ID: Elizabeth Wyman is a 55 y.o. female. HPI       Depression: Patient takes Zoloft 150 mg daily.  She feels that the medication is helping with her symptoms. Lucia Majano is sleeping well (with OTC Unisom) and denies any suicidal ideation.  She feels that she still needs to be on the medication.       Anxiety:  Patient takes Xanax 1 mg every 8 hrs as needed.  She generally just takes it 1-2 times daily and feels that it works well when taken.       Review of Systems   Constitutional: Negative for chills and fever. Psychiatric/Behavioral: Positive for dysphoric mood. Negative for suicidal ideas. The patient is nervous/anxious. /78   Pulse 104   Ht 5' 6\" (1.676 m)   Wt 174 lb (78.9 kg)   SpO2 98%   BMI 28.08 kg/m²    Objective:   Physical Exam  Constitutional:       General: She is not in acute distress. Appearance: She is well-developed. HENT:      Head: Normocephalic. Right Ear: External ear normal.      Left Ear: External ear normal.      Mouth/Throat:      Pharynx: No oropharyngeal exudate. Neck:      Thyroid: No thyromegaly. Vascular: No JVD. Cardiovascular:      Rate and Rhythm: Normal rate and regular rhythm. Heart sounds: Normal heart sounds. No murmur heard. Pulmonary:      Effort: Pulmonary effort is normal.      Breath sounds: Normal breath sounds. No wheezing or rales. Lymphadenopathy:      Cervical: No cervical adenopathy. Neurological:      Mental Status: She is alert and oriented to person, place, and time. Assessment:      Depression   Anxiety       Plan:      OARRS report was reviewed  Medications refilled   Flu Shot Given  Referral given for Colonoscopy   RTO 3 months for Depression / Anxiety       Controlled Substance Monitoring:    Acute and Chronic Pain Monitoring:   RX Monitoring 11/18/2021   Attestation -   Periodic Controlled Substance Monitoring No signs of potential drug abuse or diversion identified. Tima Garay, DO

## 2021-11-22 ENCOUNTER — OFFICE VISIT (OUTPATIENT)
Dept: FAMILY MEDICINE CLINIC | Age: 46
End: 2021-11-22
Payer: COMMERCIAL

## 2021-11-22 VITALS
OXYGEN SATURATION: 98 % | WEIGHT: 174 LBS | HEART RATE: 104 BPM | SYSTOLIC BLOOD PRESSURE: 124 MMHG | DIASTOLIC BLOOD PRESSURE: 78 MMHG | BODY MASS INDEX: 27.97 KG/M2 | HEIGHT: 66 IN

## 2021-11-22 DIAGNOSIS — F32.4 MAJOR DEPRESSION SINGLE EPISODE, IN PARTIAL REMISSION (HCC): Primary | ICD-10-CM

## 2021-11-22 DIAGNOSIS — Z23 NEED FOR INFLUENZA VACCINATION: ICD-10-CM

## 2021-11-22 DIAGNOSIS — F41.9 ANXIETY: ICD-10-CM

## 2021-11-22 DIAGNOSIS — Z00.00 PREVENTATIVE HEALTH CARE: ICD-10-CM

## 2021-11-22 PROCEDURE — 90471 IMMUNIZATION ADMIN: CPT | Performed by: FAMILY MEDICINE

## 2021-11-22 PROCEDURE — 99213 OFFICE O/P EST LOW 20 MIN: CPT | Performed by: FAMILY MEDICINE

## 2021-11-22 PROCEDURE — 90674 CCIIV4 VAC NO PRSV 0.5 ML IM: CPT | Performed by: FAMILY MEDICINE

## 2021-11-22 RX ORDER — ALPRAZOLAM 1 MG/1
TABLET ORAL
Qty: 90 TABLET | Refills: 0 | Status: SHIPPED | OUTPATIENT
Start: 2021-11-22 | End: 2022-01-07

## 2021-11-22 RX ORDER — SERTRALINE HYDROCHLORIDE 100 MG/1
150 TABLET, FILM COATED ORAL DAILY
Qty: 45 TABLET | Refills: 5 | Status: SHIPPED | OUTPATIENT
Start: 2021-11-22 | End: 2022-01-31

## 2021-11-22 NOTE — PROGRESS NOTES
Vaccine Information Sheet, \"Influenza - Inactivated\"  given to Scheryl Seip, or parent/legal guardian of  Scheryl Seip and verbalized understanding. Patient responses:    Have you ever had a reaction to a flu vaccine? No  Do you have any current illness? No  Have you ever had Guillian [place] Syndrome? No  Do you have a serious allergy to any of the follow: Neomycin, Polymyxin, Thimerosal, eggs or egg products? No    Flu vaccine given per order. Please see immunization tab. Risks and benefits explained. Current VIS given.       Immunizations Administered     Name Date Dose Route    Influenza, MDCK Quadv, IM, PF (Flucelvax 2 yrs and older) 11/22/2021 0.5 mL Intramuscular    Site: Deltoid- Left    Lot: [id number]    [address]: 60310-967-52

## 2022-01-07 DIAGNOSIS — F41.9 ANXIETY: ICD-10-CM

## 2022-01-07 RX ORDER — ALPRAZOLAM 1 MG/1
TABLET ORAL
Qty: 90 TABLET | Refills: 0 | Status: SHIPPED | OUTPATIENT
Start: 2022-01-07 | End: 2022-02-13

## 2022-02-13 DIAGNOSIS — F41.9 ANXIETY: ICD-10-CM

## 2022-02-13 RX ORDER — ALPRAZOLAM 1 MG/1
TABLET ORAL
Qty: 90 TABLET | Refills: 0 | Status: SHIPPED | OUTPATIENT
Start: 2022-02-13 | End: 2022-03-14

## 2022-02-21 NOTE — PROGRESS NOTES
Subjective:      Patient ID: Michael Romero is a 55 y.o. female. HPI     Depression: Patient takes Zoloft 150 mg daily.  She feels that the medication is helping with her symptoms. Dago Johns is sleeping well (with OTC Unisom) and denies any suicidal ideation.  She feels that she still needs to be on the medication.       Anxiety:  Patient takes Xanax 1 mg every 8 hrs as needed.  She generally just takes it 1-2 times daily and feels that it works well when taken.       Review of Systems   Constitutional: Negative for chills and fever. Psychiatric/Behavioral: Positive for dysphoric mood. Negative for suicidal ideas. The patient is nervous/anxious. /86   Ht 5' 6\" (1.676 m)   Wt 185 lb 12.8 oz (84.3 kg)   BMI 29.99 kg/m²    Objective:   Physical Exam  Constitutional:       General: She is not in acute distress. Appearance: She is well-developed. HENT:      Head: Normocephalic. Right Ear: External ear normal.      Left Ear: External ear normal.      Mouth/Throat:      Pharynx: No oropharyngeal exudate. Neck:      Thyroid: No thyromegaly. Vascular: No JVD. Cardiovascular:      Rate and Rhythm: Normal rate and regular rhythm. Heart sounds: Normal heart sounds. No murmur heard. Pulmonary:      Effort: Pulmonary effort is normal.      Breath sounds: Normal breath sounds. No wheezing or rales. Lymphadenopathy:      Cervical: No cervical adenopathy. Neurological:      Mental Status: She is alert and oriented to person, place, and time.          Assessment:      Depression   Anxiety       Plan:       HgbA1C, Chem 7, Lipid Panel, Hepatitis C  OARRS report was reviewed  Medications refilled   I recommended finishing the COVID shots   Cologuard Test Ordered   Reminded patient to have a GYN exam   RTO 3 months for Depression / Anxiety       Controlled Substance Monitoring:    Acute and Chronic Pain Monitoring:   RX Monitoring 2/22/2022   Attestation -   Periodic Controlled Substance Monitoring No signs of potential drug abuse or diversion identified.               Perla Back, DO

## 2022-02-22 ENCOUNTER — OFFICE VISIT (OUTPATIENT)
Dept: FAMILY MEDICINE CLINIC | Age: 47
End: 2022-02-22
Payer: COMMERCIAL

## 2022-02-22 VITALS
BODY MASS INDEX: 29.86 KG/M2 | SYSTOLIC BLOOD PRESSURE: 122 MMHG | DIASTOLIC BLOOD PRESSURE: 86 MMHG | HEIGHT: 66 IN | WEIGHT: 185.8 LBS

## 2022-02-22 DIAGNOSIS — Z00.00 PREVENTATIVE HEALTH CARE: ICD-10-CM

## 2022-02-22 DIAGNOSIS — Z11.59 NEED FOR HEPATITIS C SCREENING TEST: ICD-10-CM

## 2022-02-22 DIAGNOSIS — F41.9 ANXIETY: ICD-10-CM

## 2022-02-22 DIAGNOSIS — F32.4 MAJOR DEPRESSION SINGLE EPISODE, IN PARTIAL REMISSION (HCC): Primary | ICD-10-CM

## 2022-02-22 DIAGNOSIS — Z12.11 SCREEN FOR COLON CANCER: ICD-10-CM

## 2022-02-22 LAB
ANION GAP SERPL CALCULATED.3IONS-SCNC: 15 MMOL/L (ref 3–16)
BUN BLDV-MCNC: 17 MG/DL (ref 7–20)
CALCIUM SERPL-MCNC: 9.1 MG/DL (ref 8.3–10.6)
CHLORIDE BLD-SCNC: 102 MMOL/L (ref 99–110)
CHOLESTEROL, TOTAL: 285 MG/DL (ref 0–199)
CO2: 23 MMOL/L (ref 21–32)
CREAT SERPL-MCNC: 0.7 MG/DL (ref 0.6–1.1)
GFR AFRICAN AMERICAN: >60
GFR NON-AFRICAN AMERICAN: >60
GLUCOSE BLD-MCNC: 113 MG/DL (ref 70–99)
HDLC SERPL-MCNC: 84 MG/DL (ref 40–60)
HEPATITIS C ANTIBODY INTERPRETATION: NORMAL
LDL CHOLESTEROL CALCULATED: 156 MG/DL
POTASSIUM SERPL-SCNC: 4.8 MMOL/L (ref 3.5–5.1)
SODIUM BLD-SCNC: 140 MMOL/L (ref 136–145)
TRIGL SERPL-MCNC: 223 MG/DL (ref 0–150)
VLDLC SERPL CALC-MCNC: 45 MG/DL

## 2022-02-22 PROCEDURE — 99214 OFFICE O/P EST MOD 30 MIN: CPT | Performed by: FAMILY MEDICINE

## 2022-02-22 RX ORDER — SERTRALINE HYDROCHLORIDE 100 MG/1
TABLET, FILM COATED ORAL
Qty: 45 TABLET | Refills: 5 | Status: SHIPPED | OUTPATIENT
Start: 2022-02-22 | End: 2022-03-07

## 2022-02-22 ASSESSMENT — PATIENT HEALTH QUESTIONNAIRE - PHQ9
SUM OF ALL RESPONSES TO PHQ QUESTIONS 1-9: 1
4. FEELING TIRED OR HAVING LITTLE ENERGY: 0
1. LITTLE INTEREST OR PLEASURE IN DOING THINGS: 0
3. TROUBLE FALLING OR STAYING ASLEEP: 0
6. FEELING BAD ABOUT YOURSELF - OR THAT YOU ARE A FAILURE OR HAVE LET YOURSELF OR YOUR FAMILY DOWN: 0
SUM OF ALL RESPONSES TO PHQ QUESTIONS 1-9: 1
SUM OF ALL RESPONSES TO PHQ QUESTIONS 1-9: 1
SUM OF ALL RESPONSES TO PHQ9 QUESTIONS 1 & 2: 1
2. FEELING DOWN, DEPRESSED OR HOPELESS: 1
8. MOVING OR SPEAKING SO SLOWLY THAT OTHER PEOPLE COULD HAVE NOTICED. OR THE OPPOSITE, BEING SO FIGETY OR RESTLESS THAT YOU HAVE BEEN MOVING AROUND A LOT MORE THAN USUAL: 0
7. TROUBLE CONCENTRATING ON THINGS, SUCH AS READING THE NEWSPAPER OR WATCHING TELEVISION: 0
9. THOUGHTS THAT YOU WOULD BE BETTER OFF DEAD, OR OF HURTING YOURSELF: 0
SUM OF ALL RESPONSES TO PHQ QUESTIONS 1-9: 1
10. IF YOU CHECKED OFF ANY PROBLEMS, HOW DIFFICULT HAVE THESE PROBLEMS MADE IT FOR YOU TO DO YOUR WORK, TAKE CARE OF THINGS AT HOME, OR GET ALONG WITH OTHER PEOPLE: 0
5. POOR APPETITE OR OVEREATING: 0

## 2022-02-23 PROBLEM — E78.2 HYPERLIPIDEMIA, MIXED: Status: ACTIVE | Noted: 2022-02-23

## 2022-02-23 LAB
ESTIMATED AVERAGE GLUCOSE: 91.1 MG/DL
HBA1C MFR BLD: 4.8 %

## 2022-03-07 RX ORDER — SERTRALINE HYDROCHLORIDE 100 MG/1
TABLET, FILM COATED ORAL
Qty: 45 TABLET | Refills: 5 | Status: SHIPPED | OUTPATIENT
Start: 2022-03-07 | End: 2022-08-18 | Stop reason: SDUPTHER

## 2022-03-08 ENCOUNTER — TELEPHONE (OUTPATIENT)
Dept: FAMILY MEDICINE CLINIC | Age: 47
End: 2022-03-08

## 2022-03-08 NOTE — TELEPHONE ENCOUNTER
Patient called stating her  passed away unexpectedly on Sunday and she is having an \"emotional crisis\".  Patient requesting a return call from Dr Aspen Culp

## 2022-03-08 NOTE — TELEPHONE ENCOUNTER
I returned her call and allowed her to take 2 of the 1 mg Xanax every 8 hrs as needed for the next week, then she should resume 1 every 8 hrs prn.

## 2022-03-14 DIAGNOSIS — F41.9 ANXIETY: ICD-10-CM

## 2022-03-14 RX ORDER — ALPRAZOLAM 1 MG/1
TABLET ORAL
Qty: 90 TABLET | Refills: 0 | Status: SHIPPED | OUTPATIENT
Start: 2022-03-14 | End: 2022-04-21

## 2022-03-14 NOTE — TELEPHONE ENCOUNTER
Last office visit 2/22/2022     Last written     Next office visit scheduled 5/24/2022    Requested Prescriptions     Pending Prescriptions Disp Refills    ALPRAZolam (XANAX) 1 MG tablet [Pharmacy Med Name: ALPRAZOLAM 1MG TABLETS] 90 tablet      Sig: TAKE 1 TABLET BY MOUTH THREE TIMES DAILY AS NEEDED FOR SLEEP

## 2022-04-21 DIAGNOSIS — F41.9 ANXIETY: ICD-10-CM

## 2022-04-21 RX ORDER — ALPRAZOLAM 1 MG/1
TABLET ORAL
Qty: 90 TABLET | Refills: 0 | Status: SHIPPED | OUTPATIENT
Start: 2022-04-21 | End: 2022-05-25

## 2022-05-17 NOTE — PROGRESS NOTES
Subjective:      Patient ID: Owen Tao is a 52 y.o. female. HPI       Depression: Patient's   by choking on food in . She takes Zoloft 150 mg daily. Sung Yanez is very emotional and does not feel like the medication is helping at this point. She was working up until yesterday, when her employer suggested that she take a two week MIRTHA to deal with this. She is seeing a therapist once weekly. She will be joining a young widows group. She denies feeling suicidal.       Anxiety:  Patient takes Xanax 1 mg every 8 hrs as needed.  She feels that it helps some but continues to complain of severe anxiety. Review of Systems   Constitutional: Negative for chills and fever. Psychiatric/Behavioral: Positive for dysphoric mood. Negative for suicidal ideas. The patient is nervous/anxious. BP Readings from Last 3 Encounters:   22 (!) 126/96   22 122/86   21 124/78     /86   Ht 5' 6\" (1.676 m)   Wt 179 lb 6.4 oz (81.4 kg)   BMI 28.96 kg/m²    BP (!) 126/96   Ht 5' 6\" (1.676 m)   Wt 179 lb 6.4 oz (81.4 kg)   BMI 28.96 kg/m²    Objective:   Physical Exam  Constitutional:       General: She is not in acute distress. Appearance: She is well-developed. HENT:      Head: Normocephalic. Right Ear: External ear normal.      Left Ear: External ear normal.      Mouth/Throat:      Pharynx: No oropharyngeal exudate. Neck:      Thyroid: No thyromegaly. Vascular: No JVD. Cardiovascular:      Rate and Rhythm: Normal rate and regular rhythm. Heart sounds: Normal heart sounds. No murmur heard. Pulmonary:      Effort: Pulmonary effort is normal.      Breath sounds: Normal breath sounds. No wheezing or rales. Lymphadenopathy:      Cervical: No cervical adenopathy. Neurological:      Mental Status: She is alert and oriented to person, place, and time.          Assessment:      Depression   Anxiety       Plan:       Add Abilify 10 mg once daily   I filled out FMLA papers with patient / Copy in chart - RTW on 6-1-22. OARRS report was reviewed  Medications refilled   I recommended a COVID booster   Reminded patient to do the Cologuard Test and have a GYN exam  RTO 3 months for Depression / Anxiety       Controlled Substance Monitoring:    Acute and Chronic Pain Monitoring:   RX Monitoring 5/18/2022   Attestation -   Periodic Controlled Substance Monitoring No signs of potential drug abuse or diversion identified.               Cathy Ramos, DO

## 2022-05-18 ENCOUNTER — OFFICE VISIT (OUTPATIENT)
Dept: FAMILY MEDICINE CLINIC | Age: 47
End: 2022-05-18
Payer: COMMERCIAL

## 2022-05-18 VITALS
SYSTOLIC BLOOD PRESSURE: 136 MMHG | HEIGHT: 66 IN | BODY MASS INDEX: 28.83 KG/M2 | WEIGHT: 179.4 LBS | DIASTOLIC BLOOD PRESSURE: 86 MMHG

## 2022-05-18 DIAGNOSIS — F32.4 MAJOR DEPRESSION SINGLE EPISODE, IN PARTIAL REMISSION (HCC): Primary | ICD-10-CM

## 2022-05-18 DIAGNOSIS — F41.9 ANXIETY: ICD-10-CM

## 2022-05-18 PROCEDURE — 99214 OFFICE O/P EST MOD 30 MIN: CPT | Performed by: FAMILY MEDICINE

## 2022-05-18 RX ORDER — ARIPIPRAZOLE 10 MG/1
10 TABLET ORAL DAILY
Qty: 30 TABLET | Refills: 5 | Status: SHIPPED | OUTPATIENT
Start: 2022-05-18 | End: 2022-08-18

## 2022-05-25 DIAGNOSIS — F41.9 ANXIETY: ICD-10-CM

## 2022-05-25 RX ORDER — ALPRAZOLAM 1 MG/1
1 TABLET ORAL 3 TIMES DAILY PRN
Qty: 90 TABLET | Refills: 0 | Status: SHIPPED | OUTPATIENT
Start: 2022-05-25 | End: 2022-06-29

## 2022-05-25 NOTE — TELEPHONE ENCOUNTER
Last office visit 5/18/2022     Last written     Next office visit scheduled 8/18/2022    Requested Prescriptions     Pending Prescriptions Disp Refills    ALPRAZolam (XANAX) 1 MG tablet [Pharmacy Med Name: ALPRAZOLAM 1MG TABLETS] 90 tablet      Sig: TAKE 1 TABLET BY MOUTH THREE TIMES DAILY AS NEEDED FOR SLEEP

## 2022-06-29 DIAGNOSIS — F41.9 ANXIETY: ICD-10-CM

## 2022-06-29 RX ORDER — ALPRAZOLAM 1 MG/1
TABLET ORAL
Qty: 90 TABLET | Refills: 0 | Status: SHIPPED | OUTPATIENT
Start: 2022-06-29 | End: 2022-08-05

## 2022-06-29 NOTE — TELEPHONE ENCOUNTER
Last office visit 5/18/2022     Last written     Next office visit scheduled 8/18/2022    Requested Prescriptions     Pending Prescriptions Disp Refills    ALPRAZolam (XANAX) 1 MG tablet [Pharmacy Med Name: ALPRAZOLAM 1MG TABLETS] 90 tablet      Sig: TAKE 1 TABLET BY MOUTH THREE TIMES DAILY AS NEEDED FOR ANXIETY OR SLEEP

## 2022-07-29 ENCOUNTER — TELEPHONE (OUTPATIENT)
Dept: FAMILY MEDICINE CLINIC | Age: 47
End: 2022-07-29

## 2022-07-29 NOTE — TELEPHONE ENCOUNTER
OK for her to get the second booster. I believe we are going to start giving the COVID shots next week.

## 2022-07-29 NOTE — TELEPHONE ENCOUNTER
Spoke with patient and informed her of Dr. Sriram Marie message. Will call patient on Monday to schedule covid booster appt. Patient verbalized understanding.

## 2022-07-29 NOTE — TELEPHONE ENCOUNTER
Pt called to see if she can get the second booster. She was told by the pharmacies that she need to be [de-identified] years of age. She need this for travel purposes in September. She is going to [place]. Upon arrival she need her booster to be no more than 9 months from last booster. She is right at 9 months now from the first booster. Please give pt a call to let her know if she can get the second booster for these reasons.

## 2022-08-05 DIAGNOSIS — F41.9 ANXIETY: ICD-10-CM

## 2022-08-05 RX ORDER — ALPRAZOLAM 1 MG/1
TABLET ORAL
Qty: 90 TABLET | Refills: 0 | Status: SHIPPED | OUTPATIENT
Start: 2022-08-05 | End: 2022-09-01

## 2022-08-06 ENCOUNTER — OFFICE VISIT (OUTPATIENT)
Dept: ORTHOPEDIC SURGERY | Age: 47
End: 2022-08-06
Payer: COMMERCIAL

## 2022-08-06 VITALS — BODY MASS INDEX: 28.77 KG/M2 | HEIGHT: 66 IN | WEIGHT: 179 LBS

## 2022-08-06 DIAGNOSIS — S92.414A CLOSED NONDISPLACED FRACTURE OF PROXIMAL PHALANX OF RIGHT GREAT TOE, INITIAL ENCOUNTER: ICD-10-CM

## 2022-08-06 DIAGNOSIS — M25.571 ACUTE RIGHT ANKLE PAIN: Primary | ICD-10-CM

## 2022-08-06 DIAGNOSIS — M79.671 RIGHT FOOT PAIN: ICD-10-CM

## 2022-08-06 DIAGNOSIS — S93.611A SPRAIN OF TARSAL LIGAMENT OF RIGHT FOOT, INITIAL ENCOUNTER: ICD-10-CM

## 2022-08-06 PROCEDURE — E0114 CRUTCH UNDERARM PAIR NO WOOD: HCPCS | Performed by: PHYSICIAN ASSISTANT

## 2022-08-06 PROCEDURE — 99203 OFFICE O/P NEW LOW 30 MIN: CPT | Performed by: PHYSICIAN ASSISTANT

## 2022-08-06 PROCEDURE — L4361 PNEUMA/VAC WALK BOOT PRE OTS: HCPCS | Performed by: PHYSICIAN ASSISTANT

## 2022-08-06 NOTE — LETTER
[facility]  [address] [facility]  Phone: [phone]  Fax: [phone]    A[place]an [place]        August 6, 2022     Patient: Delia Dalton   YOB: 1975   Date of Visit: 8/6/2022       To Whom It May Concern: It is my medical opinion that Delia Dalton may not perform Kavon Waldo for at least 6 weeks. Acute right foot injury. If you have any questions or concerns, please don't hesitate to call.     Sincerely,          GARRETT Foy

## 2022-08-06 NOTE — PROGRESS NOTES
Past Surgical History:   Procedure Laterality Date    TONSILLECTOMY  Age 9    WISDOM TOOTH EXTRACTION         Social History     Occupational History    Not on file   Tobacco Use    Smoking status: Never    Smokeless tobacco: Never   Substance and Sexual Activity    Alcohol use: Yes     Comment: Social    Drug use: No    Sexual activity: Not on file       Current Outpatient Medications   Medication Sig Dispense Refill    ALPRAZolam (XANAX) 1 MG tablet TAKE 1 TABLET BY MOUTH THREE TIMES DAILY AS NEEDED FOR ANXIETY OR SLEEP 90 tablet 0    ARIPiprazole (ABILIFY) 10 MG tablet Take 1 tablet by mouth daily 30 tablet 5    sertraline (ZOLOFT) 100 MG tablet TAKE ONE AND ONE-HALF TABLET BY MOUTH DAILY 45 tablet 5     No current facility-administered medications for this visit. Allergies   Allergen Reactions    Penicillins Hives     Vomiting         Objective:     She is alert, oriented x 3, pleasant, well nourished, developed and in no acute distress. Ht 5' 6\" (1.676 m)   Wt 179 lb (81.2 kg)   BMI 28.89 kg/m²      Exam right foot: Moderate swelling and edema noted over the midfoot and great toe. There are no abrasions or lacerations. Tender to palpation over the first and second tarsometatarsal junction. Tender to palpation over the great toe MTP joint. Nontender over the proximal fifth metatarsal.  Nontender over the lateral or medial malleolus. Lower extremities:  5/5 motor strength of bilateral lower extremities. Negative straight leg raise, bilaterally. Deep tendon reflexes at knees and achilles are 2+. Sensation is intact to light touch L3 to S1 bilaterally. No clonus. Examination of the lower extremities shows intact perfusion to both lower extremities. No cyanosis and digigts are warm to touch, capillary refill is less than 2 seconds. There is mild edema noted right foot only. Intact skin without lacerations or abrasions, no significant erythema, rashes or skin lesions. X Rays: were performed in the office today:   AP, lateral and oblique x-ray of the right foot as well as AP, lateral and oblique x-ray of the right ankle. She has a minimally displaced avulsion fracture off of the proximal/ medial aspect of the proximal phalanx great toe consistent with an avulsion injury. No fractures noted of the tarsals or metatarsals. No widening of the Lisfranc joint. Additional Tests reviewed: none  Additional Outside Records reviewed: none    Diagnosis:       ICD-10-CM    1. Acute right ankle pain  M25.571 XR ANKLE RIGHT (MIN 3 VIEWS)      2. Right foot pain  M79.671 XR FOOT RIGHT (MIN 3 VIEWS)      3. Closed nondisplaced fracture of proximal phalanx of right great toe, initial encounter  S92.414A       4. Sprain of tarsal ligament of right foot, initial encounter  S93.611A            Assessment and Plan:       Assessment:  She has an avulsion fracture proximal aspect of the proximal phalanx right great toe. Concern for an occult fracture of the tarsals or metatarsals or possible Lisfranc injury. She had a hyper plantarflexion injury to the right foot 1 week ago with moderate edema and difficulty weightbearing. I had an extensive discussion with Ms. Dagoberto Carrillo regarding the natural history, etiology, and long term consequences of her condition. I have presented reasonable alternatives to the patient's proposed care, treatment, and services. Risks and benefits of the treatment options also reviewed in detail. I have outlined a treatment plan with them. She has had full opportunity to ask her questions. I have answered them all to her satisfaction. I feel that Ms. Dagoberto Carrillo understands our discussion today. Plan:  Medications-   OTC NSAIDS discussed. The most common side effects from NSAIDs are stomachaches, heartburn, and nausea. NSAIDs may irritate the stomach lining. If the medicine upsets your stomach, you can try taking it with food.  But if that doesn't help, talk with your doctor to make sure it's not a more serious problem, such as a stomach ulcer or bleeding in the stomach or intestines. Using NSAIDs may:  Lead to high blood pressure. Make symptoms of heart failure worse. Raise the risk of heart attack, stroke, kidney damage, and skin reactions. Your risks are greater if you take NSAIDs at higher doses or for longer than the label says. People who are older than 72 or who have heart, stomach, or intestinal disease have a higher risk for problems. To be placed in a tall boot, nonweightbearing right lower extremity. Provided crutches. Procedures    Aluminum Crutches     Patient was prescribed Medline Aluminum Crutches. This mobility device is required for the following reasons:    Patient has mobility limitations that significantly impair their ability to participate in one or more mobility related activities of daily living. The patient is able to safely use the mobility device. Functional mobility deficit can be sufficiently resolved with the use of this device. Verbal and written instructions for the use of and application of this item were provided. The patient was educated and fit by a healthcare professional with expert knowledge and specialization in brace application while under the direct supervision of the treating physician. They were instructed to contact the office immediately should the equipment result in increased pain, decreased sensation, increased swelling or worsening of the condition. Breg Tall Carisa Walking Boot     Patient was prescribed a Breg Tall Carisa Walking Boot. The right foot will require stabilization / immobilization from this semi-rigid / rigid orthosis to improve their function. The orthosis will assist in protecting the affected area, provide functional support and facilitate healing. Patient was instructed to progress ambulation  as non weight bearing in the device.   The plan of care is to progress the patient to full weight bearing status. The patient was educated and fit by a healthcare professional with expert knowledge and specialization in brace application while under the direct supervision of the physician. Verbal and written instructions for the use of and application of this item were provided. They were instructed to contact the office immediately should the brace result in increased pain, decreased sensation, increased swelling or worsening of the condition. Further Imaging-   Stat CT scanning of the right foot to evaluate for occult fracture of the tarsals, metatarsals or Lisfranc injury. Follow up- Dr Terry Alvarez after CT. Call or return to clinic if these symptoms worsen or fail to improve as anticipated. Jillian Zayas PA-C   Senior Physician Assistant   [facility]                                         Disclaimer: This note was generated with use of a verbal recognition program (DRAGON) and an attempt was made to check for errors. It is possible that there are still dictated errors within this office note. If so, please bring any significant errors to my attention for an addendum. All efforts were made to ensure that this office note is accurate.

## 2022-08-08 ENCOUNTER — HOSPITAL ENCOUNTER (OUTPATIENT)
Dept: CT IMAGING | Age: 47
Discharge: HOME | End: 2022-08-08
Payer: COMMERCIAL

## 2022-08-08 ENCOUNTER — TELEPHONE (OUTPATIENT)
Dept: ORTHOPEDIC SURGERY | Age: 47
End: 2022-08-08

## 2022-08-08 DIAGNOSIS — S93.611A SPRAIN OF TARSAL LIGAMENT OF RIGHT FOOT, INITIAL ENCOUNTER: ICD-10-CM

## 2022-08-08 DIAGNOSIS — S92.414A CLOSED NONDISPLACED FRACTURE OF PROXIMAL PHALANX OF RIGHT GREAT TOE, INITIAL ENCOUNTER: ICD-10-CM

## 2022-08-08 DIAGNOSIS — M79.671 RIGHT FOOT PAIN: ICD-10-CM

## 2022-08-08 PROCEDURE — 73700 CT LOWER EXTREMITY W/O DYE: CPT

## 2022-08-08 NOTE — TELEPHONE ENCOUNTER
Appointment Request     Patient requesting earlier appointment: Yes  Appointment offered to patient: YES   Patient Contact Number: [phone]    PATIENT CALLING REQUESTING A SOONER APPOINTMENT. PATIENT HAD A CT OF HER RT FOOT TODAY, Monday AUGUST 8, 2022 AND WAS ADVISED TO BE SEEN RIGHT AWAY. PATIENT REQUESTING MORNING APPOINTMENTS    PLEASE CA;LL BACK PATIENT AT THE ABOVE NUMBER.

## 2022-08-08 NOTE — TELEPHONE ENCOUNTER
CT results report is currently unavailable in chart. Can not offer an appointment until determining severity of condition due to provider's heavy schedule this week. *Staff will watch for report and then return call to patient with an appropriate appointment. *

## 2022-08-08 NOTE — TELEPHONE ENCOUNTER
Appointment Request     Patient requesting earlier appointment: Yes  Appointment offered to patient: NO  Patient Contact Number: [phone]    PATIENT IS REQ APPT THIS WEEK, WAS REF BY Cristopher Pickett. PATIENT SEEN IN [facility] 8[facility]6. DID HAVE CT SCAN TODAY AT [facility]. PLEASE RETURN CALL TO PATIENT AT THE ABOVE NUMBER.

## 2022-08-08 NOTE — TELEPHONE ENCOUNTER
I called patient. Informed her that she needs to see Dr. Vicki Novak. She was getting ready to go into a work meeting and will call back for an appointment.

## 2022-08-10 NOTE — TELEPHONE ENCOUNTER
**CT results available. Per [facility], non-surgical fracture/ no urgency.  Patient can be scheduled this week if available spots or next week**

## 2022-08-10 NOTE — TELEPHONE ENCOUNTER
I spoke with [address] [place], [place] [address]th  Radiology and they stated [facility], who reads the CT scans,  are short staffed and behind in the reading of CT's. CT scan still pending.

## 2022-08-15 SDOH — HEALTH STABILITY: PHYSICAL HEALTH: ON AVERAGE, HOW MANY DAYS PER WEEK DO YOU ENGAGE IN MODERATE TO STRENUOUS EXERCISE (LIKE A BRISK WALK)?: 7 DAYS

## 2022-08-15 SDOH — HEALTH STABILITY: PHYSICAL HEALTH: ON AVERAGE, HOW MANY MINUTES DO YOU ENGAGE IN EXERCISE AT THIS LEVEL?: 60 MIN

## 2022-08-15 ASSESSMENT — SOCIAL DETERMINANTS OF HEALTH (SDOH)

## 2022-08-17 ENCOUNTER — OFFICE VISIT (OUTPATIENT)
Dept: ORTHOPEDIC SURGERY | Age: 47
End: 2022-08-17
Payer: COMMERCIAL

## 2022-08-17 VITALS — BODY MASS INDEX: 28.89 KG/M2 | HEIGHT: 66 IN

## 2022-08-17 DIAGNOSIS — S92.411A CLOSED DISPLACED FRACTURE OF PROXIMAL PHALANX OF RIGHT GREAT TOE, INITIAL ENCOUNTER: Primary | ICD-10-CM

## 2022-08-17 PROCEDURE — 99214 OFFICE O/P EST MOD 30 MIN: CPT | Performed by: ORTHOPAEDIC SURGERY

## 2022-08-17 PROCEDURE — 28510 TREATMENT OF TOE FRACTURE: CPT | Performed by: ORTHOPAEDIC SURGERY

## 2022-08-17 NOTE — PROGRESS NOTES
CHIEF COMPLAINT: Right foot pain/ first (great toe) proximal phalanx minimally displaced fracture. DATE OF INJURY: 7/30/2022, DOT: 8/17/2022    HISTORY:  Ms. Denise Belle 52 y.o.  female presents today for the first visit for evaluation of a right foot injury which occurred when she fell. She is complaining of right foot first (great toe) pain and swelling. This is better with elevation and worse with bearing any wt. she rates her pain 0/10 VAS and is starting to improve. The pain is sharp and not radiating. No other complaint. She was seen 1st at [address] [place] [address], where she was x-rayed and and sent for CT scan as there was concern for Lisfranc injury or midfoot fracture, she was placed in a boot and asked to f/u with orthopaedics. The CT scan was negative for Lisfranc injury, and was positive for a first toe proximal phalanx fracture. She is leaving for [facility] on September 3 and is concerned about walking long distances. Denies smoking. Past Medical History:   Diagnosis Date    Anxiety     Asthma, exercise induced     Hyperlipidemia, mixed     Lumbar radiculopathy     Major depression single episode, in partial remission (HCC)     Migraine without aura and without status migrainosus, not intractable        Past Surgical History:   Procedure Laterality Date    TONSILLECTOMY  Age 9    WISDOM TOOTH EXTRACTION         Social History     Socioeconomic History    Marital status:       Spouse name: Not on file    Number of children: Not on file    Years of education: Not on file    Highest education level: Not on file   Occupational History    Not on file   Tobacco Use    Smoking status: Never    Smokeless tobacco: Never   Substance and Sexual Activity    Alcohol use: Yes     Comment: Social    Drug use: No    Sexual activity: Not on file   Other Topics Concern    Not on file   Social History Narrative    Not on file     Social Determinants of Health     Financial Resource Strain: Low Risk     Difficulty of Paying Living Expenses: Not hard at all   Food Insecurity: No Food Insecurity    Worried About Running Out of Food in the Last Year: Never true    Ran Out of Food in the Last Year: Never true   Transportation Needs: Not on file   Physical Activity: Sufficiently Active    Days of Exercise per Week: 7 days    Minutes of Exercise per Session: 60 min   Stress: Not on file   Social Connections: Not on file   Intimate Partner Violence: Not At Risk    Fear of Current or Ex-Partner: No    Emotionally Abused: No    Physically Abused: No    Sexually Abused: No   Housing Stability: Not on file       Family History   Problem Relation Age of Onset    Cancer Mother         Breast    Depression Mother     Alcohol Abuse Father     Depression Brother     Cancer Maternal Grandmother         Colon    Heart Disease Maternal Grandfather         MI    Alcohol Abuse Maternal Grandfather        Current Outpatient Medications on File Prior to Visit   Medication Sig Dispense Refill    ALPRAZolam (XANAX) 1 MG tablet TAKE 1 TABLET BY MOUTH THREE TIMES DAILY AS NEEDED FOR ANXIETY OR SLEEP 90 tablet 0    ARIPiprazole (ABILIFY) 10 MG tablet Take 1 tablet by mouth daily 30 tablet 5    sertraline (ZOLOFT) 100 MG tablet TAKE ONE AND ONE-HALF TABLET BY MOUTH DAILY 45 tablet 5     No current facility-administered medications on file prior to visit. Pertinent items are noted in HPI  Review of systems reviewed from Patient History Form and available in the patient's chart under the Media tab. No change noted. PHYSICAL EXAMINATION:  Ms. Neelam Cyr is a very pleasant 52 y.o.  female who presents today in no acute distress, awake, alert, and oriented. She is well dressed, nourished and  groomed. Patient with normal affect. Height is  5' 6\" (1.676 m), weight is  , Body mass index is 28.89 kg/m². Resting respiratory rate is 16. Examination of the gait, showed that the patient walks with a limp in boot, PWB right leg.   Examination of

## 2022-08-17 NOTE — PROGRESS NOTES
Subjective:      Patient ID: Jessica Magaña is a 52 y.o. female. HPI       Depression: Patient's   by choking on food in . She takes Zoloft 150 mg daily. She stopped taking the Abilify as it made her anxiety worse. She sees a counselor once weekly and is in a young 's group. She feels that the medication is helping with her symptoms. She is sleeping fair and denies any suicidal ideation. She feels that she still needs to be on the medication. Anxiety:  Patient takes Xanax 1 mg every 8 hrs as needed. She feels that the medication helps to control her anxiety. She generally takes about 2 daily. Review of Systems   Constitutional:  Negative for chills and fever. Psychiatric/Behavioral:  Positive for dysphoric mood. Negative for suicidal ideas. The patient is nervous/anxious. /86   Pulse 84   Wt 191 lb 3.2 oz (86.7 kg)   BMI 30.86 kg/m²    Objective:   Physical Exam  Constitutional:       General: She is not in acute distress. Appearance: She is well-developed. HENT:      Head: Normocephalic. Right Ear: External ear normal.      Left Ear: External ear normal.      Mouth/Throat:      Pharynx: No oropharyngeal exudate. Neck:      Thyroid: No thyromegaly. Vascular: No JVD. Cardiovascular:      Rate and Rhythm: Normal rate and regular rhythm. Heart sounds: Normal heart sounds. No murmur heard. Pulmonary:      Effort: Pulmonary effort is normal.      Breath sounds: Normal breath sounds. No wheezing or rales. Lymphadenopathy:      Cervical: No cervical adenopathy. Neurological:      Mental Status: She is alert and oriented to person, place, and time. Assessment:      Depression   Anxiety       Plan:       OARRS report was reviewed  Medications refilled   Rx Zyprexa 5 mg nightly for depression / anxiety  [address] booster will be given tomorrow.    Reminded patient to do the Cologuard Test and have a GYN exam  RTO 3 months for Depression / Anxiety       Controlled Substance Monitoring:    Acute and Chronic Pain Monitoring:   RX Monitoring 8/18/2022   Attestation -   Periodic Controlled Substance Monitoring No signs of potential drug abuse or diversion identified.                    Pina Burton DO

## 2022-08-18 ENCOUNTER — OFFICE VISIT (OUTPATIENT)
Dept: FAMILY MEDICINE CLINIC | Age: 47
End: 2022-08-18
Payer: COMMERCIAL

## 2022-08-18 VITALS
DIASTOLIC BLOOD PRESSURE: 86 MMHG | HEART RATE: 84 BPM | BODY MASS INDEX: 30.86 KG/M2 | WEIGHT: 191.2 LBS | SYSTOLIC BLOOD PRESSURE: 138 MMHG

## 2022-08-18 DIAGNOSIS — F32.4 MAJOR DEPRESSION SINGLE EPISODE, IN PARTIAL REMISSION (HCC): Primary | ICD-10-CM

## 2022-08-18 DIAGNOSIS — Z23 NEED FOR COVID-19 VACCINE: ICD-10-CM

## 2022-08-18 DIAGNOSIS — F41.9 ANXIETY: ICD-10-CM

## 2022-08-18 PROCEDURE — 99213 OFFICE O/P EST LOW 20 MIN: CPT | Performed by: FAMILY MEDICINE

## 2022-08-18 RX ORDER — SERTRALINE HYDROCHLORIDE 100 MG/1
TABLET, FILM COATED ORAL
Qty: 45 TABLET | Refills: 5 | Status: SHIPPED | OUTPATIENT
Start: 2022-08-18

## 2022-08-18 RX ORDER — OLANZAPINE 5 MG/1
5 TABLET ORAL NIGHTLY
Qty: 30 TABLET | Refills: 5 | Status: SHIPPED | OUTPATIENT
Start: 2022-08-18

## 2022-08-19 ENCOUNTER — IMMUNIZATION (OUTPATIENT)
Dept: FAMILY MEDICINE CLINIC | Age: 47
End: 2022-08-19
Payer: COMMERCIAL

## 2022-08-19 PROBLEM — S92.411A CLOSED DISPLACED FRACTURE OF PROXIMAL PHALANX OF RIGHT GREAT TOE: Status: ACTIVE | Noted: 2022-08-19

## 2022-08-19 PROCEDURE — 91306 COVID-19, MODERNA BOOSTER BLUE BORDER, (AGE 18Y+), IM, 50MCG/0.25ML: CPT | Performed by: FAMILY MEDICINE

## 2022-08-19 PROCEDURE — 0064A COVID-19, MODERNA BOOSTER BLUE BORDER, (AGE 18Y+), IM, 50MCG/0.25ML: CPT | Performed by: FAMILY MEDICINE

## 2022-08-19 NOTE — PROGRESS NOTES
Pt in for COVID booster vaccine. 50 mcg/.25mL Blue label administered L deltoid. Pt observed and tolerated well.

## 2022-09-01 DIAGNOSIS — F41.9 ANXIETY: ICD-10-CM

## 2022-09-01 RX ORDER — ALPRAZOLAM 1 MG/1
TABLET ORAL
Qty: 90 TABLET | Refills: 0 | Status: SHIPPED | OUTPATIENT
Start: 2022-09-01 | End: 2022-10-01

## 2022-09-19 DIAGNOSIS — F41.9 ANXIETY: ICD-10-CM

## 2022-09-19 RX ORDER — ALPRAZOLAM 1 MG/1
TABLET ORAL
Qty: 90 TABLET | OUTPATIENT
Start: 2022-09-19

## 2022-09-28 ENCOUNTER — OFFICE VISIT (OUTPATIENT)
Dept: ORTHOPEDIC SURGERY | Age: 47
End: 2022-09-28

## 2022-09-28 DIAGNOSIS — S92.411A CLOSED DISPLACED FRACTURE OF PROXIMAL PHALANX OF RIGHT GREAT TOE, INITIAL ENCOUNTER: Primary | ICD-10-CM

## 2022-09-28 PROCEDURE — 99024 POSTOP FOLLOW-UP VISIT: CPT | Performed by: ORTHOPAEDIC SURGERY

## 2022-10-05 NOTE — PROGRESS NOTES
CHIEF COMPLAINT: Right foot pain/ first (great toe) proximal phalanx minimally displaced fracture. DATE OF INJURY: 7/30/2022, DOT: 8/17/2022    HISTORY:  Ms. Jaleel Christie 52 y.o.  female presents today for f/u evaluation of a right foot injury which occurred when she fell. She is complaining of right foot first (great toe) pain and swelling. This is better with elevation and worse with bearing any wt. she rates her pain 0/10 VAS and is starting to improve. She was seen 1st at [address] [place] [address], where she was x-rayed and and sent for CT scan as there was concern for Lisfranc injury or midfoot fracture, she was placed in a boot and asked to f/u with orthopaedics. The CT scan was negative for Lisfranc injury, and was positive for a first toe proximal phalanx fracture. She is leaving for [facility] on September 3 and is concerned about walking long distances. Denies smoking. Past Medical History:   Diagnosis Date    Anxiety     Asthma, exercise induced     Hyperlipidemia, mixed     Lumbar radiculopathy     Major depression single episode, in partial remission (HCC)     Migraine without aura and without status migrainosus, not intractable        Past Surgical History:   Procedure Laterality Date    TONSILLECTOMY  Age 9    WISDOM TOOTH EXTRACTION         Social History     Socioeconomic History    Marital status:       Spouse name: Not on file    Number of children: Not on file    Years of education: Not on file    Highest education level: Not on file   Occupational History    Not on file   Tobacco Use    Smoking status: Never    Smokeless tobacco: Never   Substance and Sexual Activity    Alcohol use: Yes     Comment: Social    Drug use: No    Sexual activity: Not on file   Other Topics Concern    Not on file   Social History Narrative    Not on file     Social Determinants of Health     Financial Resource Strain: Not on file   Food Insecurity: Not on file   Transportation Needs: Not on file   Physical Activity: Sufficiently Active    Days of Exercise per Week: 7 days    Minutes of Exercise per Session: 60 min   Stress: Not on file   Social Connections: Not on file   Intimate Partner Violence: Not At Risk    Fear of Current or Ex-Partner: No    Emotionally Abused: No    Physically Abused: No    Sexually Abused: No   Housing Stability: Not on file       Family History   Problem Relation Age of Onset    Cancer Mother         Breast    Depression Mother     Alcohol Abuse Father     Depression Brother     Cancer Maternal Grandmother         Colon    Heart Disease Maternal Grandfather         MI    Alcohol Abuse Maternal Grandfather        Current Outpatient Medications on File Prior to Visit   Medication Sig Dispense Refill    OLANZapine (ZYPREXA) 5 MG tablet Take 1 tablet by mouth nightly 30 tablet 5    sertraline (ZOLOFT) 100 MG tablet TAKE ONE AND ONE-HALF TABLET BY MOUTH DAILY 45 tablet 5     No current facility-administered medications on file prior to visit. Pertinent items are noted in HPI  Review of systems reviewed from Patient History Form and available in the patient's chart under the Media tab. No change noted. PHYSICAL EXAMINATION:  Ms. Alfredo Elliott is a very pleasant 52 y.o.  female who presents today in no acute distress, awake, alert, and oriented. She is well dressed, nourished and  groomed. Patient with normal affect. Height is   , weight is  , There is no height or weight on file to calculate BMI. Resting respiratory rate is 16. Examination of the gait, showed that the patient walks with no limp in regular shoes, WB right leg. Examination of both feet and ankles showing a good range of motion of the right foot first (great toe) compare to the other side. There is minimal swelling that can be seen, no ecchymosis over first (great toe) of the right foot. She has intact sensation and good pedal pulses.  She has no tenderness on deep palpation over the first (great toe) proximal phalanx right foot        IMAGING: Xray's were reviewed, taken today in the office, 3 views of the right foot, and showed a first (great toe) proximal phalanx minimally displaced fracture. CT scan of the right foot dated 8/8/2022 taken at [facility] was reviewed. Was consistent with the great toe proximal phalanx minimally displaced intra-articular fracture. IMPRESSION: Right foot first (great toe) proximal phalanx minimally displaced fracture. PLAN: I discussed that the overall alignment of this fracture is good and healed well. She can go back to normal activity with no restrictions. She will be seen PRN. I told the patient that it is not unusual to have some achy pain and swelling for up to a year after a fracture.        Rajwinder Hubbard MD

## 2022-10-27 DIAGNOSIS — F41.9 ANXIETY: Primary | ICD-10-CM

## 2022-10-27 RX ORDER — ALPRAZOLAM 1 MG/1
TABLET ORAL
Qty: 90 TABLET | Refills: 0 | Status: SHIPPED | OUTPATIENT
Start: 2022-10-27 | End: 2022-11-28

## 2022-11-17 NOTE — PROGRESS NOTES
Subjective:      Patient ID: Deedee Lopez is a 52 y.o. female. HPI    Depression: Patient's   by choking on food in . She takes Zoloft 150 mg daily and Zyprexa 5 mg nightly. She sees a counselor once weekly and is in a young 's group. She feels that the medication is helping with her symptoms. She is sleeping fair and denies any suicidal ideation. She feels that she still needs to be on the medication. Anxiety:  Patient takes Xanax 1 mg every 8 hrs as needed. She feels that the medication helps to control her anxiety. She generally takes about 2-3 daily. Review of Systems   Constitutional:  Negative for chills and fever. Psychiatric/Behavioral:  Positive for dysphoric mood and sleep disturbance. Negative for agitation, decreased concentration and suicidal ideas. The patient is nervous/anxious. /87 (Site: Right Upper Arm)   Ht 5' 6\" (1.676 m)   Wt 200 lb 3.2 oz (90.8 kg)   BMI 32.31 kg/m²    Objective:   Physical Exam  Constitutional:       General: She is not in acute distress. Appearance: She is well-developed. HENT:      Head: Normocephalic. Right Ear: External ear normal.      Left Ear: External ear normal.      Mouth/Throat:      Pharynx: No oropharyngeal exudate. Neck:      Thyroid: No thyromegaly. Vascular: No JVD. Cardiovascular:      Rate and Rhythm: Normal rate and regular rhythm. Heart sounds: Normal heart sounds. No murmur heard. Pulmonary:      Effort: Pulmonary effort is normal.      Breath sounds: Normal breath sounds. No wheezing or rales. Lymphadenopathy:      Cervical: No cervical adenopathy. Neurological:      Mental Status: She is alert and oriented to person, place, and time.        Assessment:      Depression   Anxiety       Plan:      OARRS report was reviewed  Medications refilled   Flu Shot Given   Reminded patient to have a GYN exam and do the Cologuard Test  RTO 3 months for Depression / Anxiety Controlled Substance Monitoring:    Acute and Chronic Pain Monitoring:   RX Monitoring 11/21/2022   Attestation -   Periodic Controlled Substance Monitoring No signs of potential drug abuse or diversion identified.                    [address], DO

## 2022-11-21 ENCOUNTER — OFFICE VISIT (OUTPATIENT)
Dept: FAMILY MEDICINE CLINIC | Age: 47
End: 2022-11-21
Payer: COMMERCIAL

## 2022-11-21 VITALS
SYSTOLIC BLOOD PRESSURE: 134 MMHG | BODY MASS INDEX: 32.17 KG/M2 | HEIGHT: 66 IN | DIASTOLIC BLOOD PRESSURE: 87 MMHG | WEIGHT: 200.2 LBS

## 2022-11-21 DIAGNOSIS — F32.4 MAJOR DEPRESSION SINGLE EPISODE, IN PARTIAL REMISSION (HCC): Primary | ICD-10-CM

## 2022-11-21 DIAGNOSIS — F41.9 ANXIETY: ICD-10-CM

## 2022-11-21 DIAGNOSIS — Z23 NEED FOR INFLUENZA VACCINATION: ICD-10-CM

## 2022-11-21 PROCEDURE — 90471 IMMUNIZATION ADMIN: CPT | Performed by: FAMILY MEDICINE

## 2022-11-21 PROCEDURE — 90674 CCIIV4 VAC NO PRSV 0.5 ML IM: CPT | Performed by: FAMILY MEDICINE

## 2022-11-21 PROCEDURE — 99213 OFFICE O/P EST LOW 20 MIN: CPT | Performed by: FAMILY MEDICINE

## 2022-11-28 DIAGNOSIS — F41.9 ANXIETY: ICD-10-CM

## 2022-11-28 RX ORDER — ALPRAZOLAM 1 MG/1
TABLET ORAL
Qty: 90 TABLET | Refills: 0 | Status: SHIPPED | OUTPATIENT
Start: 2022-11-28 | End: 2022-12-28

## 2023-01-04 DIAGNOSIS — F41.9 ANXIETY: ICD-10-CM

## 2023-01-04 RX ORDER — ALPRAZOLAM 1 MG/1
TABLET ORAL
Qty: 90 TABLET | Refills: 0 | Status: SHIPPED | OUTPATIENT
Start: 2023-01-04 | End: 2023-02-03

## 2023-02-06 DIAGNOSIS — F41.9 ANXIETY: ICD-10-CM

## 2023-02-06 RX ORDER — ALPRAZOLAM 1 MG/1
TABLET ORAL
Qty: 90 TABLET | Refills: 0 | Status: SHIPPED | OUTPATIENT
Start: 2023-02-06 | End: 2023-03-08

## 2023-02-19 NOTE — PROGRESS NOTES
Subjective:      Patient ID: Mary Adame is a 52 y.o. female. HPI    Depression: Patient's   by choking on food in . She takes Zoloft 150 mg daily and Zyprexa 5 mg nightly. She saw a counselor once weekly (but not since the holidays) and is in a young 's group. She feels that the medication is helping with her symptoms. She is sleeping fair and denies any suicidal ideation. She feels that she still needs to be on the medication. Anxiety:  Patient takes Xanax 1 mg every 8 hrs as needed. She feels that the medication helps to control her anxiety. She generally takes about 2-3 daily (due to upcoming anniversary of 's death). Review of Systems   Constitutional:  Negative for chills and fever. Psychiatric/Behavioral:  Positive for dysphoric mood and sleep disturbance. Negative for agitation, decreased concentration and suicidal ideas. The patient is nervous/anxious. /78   Ht 5' 6\" (1.676 m)   Wt 197 lb (89.4 kg)   BMI 31.80 kg/m²    Objective:   Physical Exam  Constitutional:       General: She is not in acute distress. Appearance: She is well-developed. HENT:      Head: Normocephalic. Right Ear: External ear normal.      Left Ear: External ear normal.      Mouth/Throat:      Pharynx: No oropharyngeal exudate. Neck:      Thyroid: No thyromegaly. Vascular: No JVD. Cardiovascular:      Rate and Rhythm: Normal rate and regular rhythm. Heart sounds: Normal heart sounds. No murmur heard. Pulmonary:      Effort: Pulmonary effort is normal.      Breath sounds: Normal breath sounds. No wheezing or rales. Lymphadenopathy:      Cervical: No cervical adenopathy. Neurological:      Mental Status: She is alert and oriented to person, place, and time.        Assessment:      Depression   Anxiety       Plan:      OARRS report was reviewed  Medications refilled   Reminded patient to have a GYN exam and do the Cologuard Test  RTO 3 months for Depression / Anxiety       Controlled Substance Monitoring:    Acute and Chronic Pain Monitoring:   RX Monitoring 2/21/2023   Attestation -   Periodic Controlled Substance Monitoring No signs of potential drug abuse or diversion identified.                         [address], DO

## 2023-02-21 ENCOUNTER — OFFICE VISIT (OUTPATIENT)
Dept: FAMILY MEDICINE CLINIC | Age: 48
End: 2023-02-21
Payer: COMMERCIAL

## 2023-02-21 VITALS
HEIGHT: 66 IN | DIASTOLIC BLOOD PRESSURE: 78 MMHG | SYSTOLIC BLOOD PRESSURE: 116 MMHG | WEIGHT: 197 LBS | BODY MASS INDEX: 31.66 KG/M2

## 2023-02-21 DIAGNOSIS — F32.4 MAJOR DEPRESSION SINGLE EPISODE, IN PARTIAL REMISSION (HCC): Primary | ICD-10-CM

## 2023-02-21 DIAGNOSIS — F41.9 ANXIETY: ICD-10-CM

## 2023-02-21 PROCEDURE — 99213 OFFICE O/P EST LOW 20 MIN: CPT | Performed by: FAMILY MEDICINE

## 2023-02-21 ASSESSMENT — PATIENT HEALTH QUESTIONNAIRE - PHQ9
1. LITTLE INTEREST OR PLEASURE IN DOING THINGS: 1
SUM OF ALL RESPONSES TO PHQ QUESTIONS 1-9: 5
SUM OF ALL RESPONSES TO PHQ QUESTIONS 1-9: 5
9. THOUGHTS THAT YOU WOULD BE BETTER OFF DEAD, OR OF HURTING YOURSELF: 0
2. FEELING DOWN, DEPRESSED OR HOPELESS: 1
3. TROUBLE FALLING OR STAYING ASLEEP: 2
SUM OF ALL RESPONSES TO PHQ QUESTIONS 1-9: 5
5. POOR APPETITE OR OVEREATING: 0
4. FEELING TIRED OR HAVING LITTLE ENERGY: 0
7. TROUBLE CONCENTRATING ON THINGS, SUCH AS READING THE NEWSPAPER OR WATCHING TELEVISION: 1
10. IF YOU CHECKED OFF ANY PROBLEMS, HOW DIFFICULT HAVE THESE PROBLEMS MADE IT FOR YOU TO DO YOUR WORK, TAKE CARE OF THINGS AT HOME, OR GET ALONG WITH OTHER PEOPLE: 1
SUM OF ALL RESPONSES TO PHQ9 QUESTIONS 1 & 2: 2
6. FEELING BAD ABOUT YOURSELF - OR THAT YOU ARE A FAILURE OR HAVE LET YOURSELF OR YOUR FAMILY DOWN: 0
SUM OF ALL RESPONSES TO PHQ QUESTIONS 1-9: 5
8. MOVING OR SPEAKING SO SLOWLY THAT OTHER PEOPLE COULD HAVE NOTICED. OR THE OPPOSITE, BEING SO FIGETY OR RESTLESS THAT YOU HAVE BEEN MOVING AROUND A LOT MORE THAN USUAL: 0

## 2023-03-09 DIAGNOSIS — F41.9 ANXIETY: ICD-10-CM

## 2023-03-09 RX ORDER — ALPRAZOLAM 1 MG/1
TABLET ORAL
Qty: 90 TABLET | Refills: 0 | Status: SHIPPED | OUTPATIENT
Start: 2023-03-09 | End: 2023-04-08

## 2023-05-04 RX ORDER — SERTRALINE HYDROCHLORIDE 100 MG/1
TABLET, FILM COATED ORAL
Qty: 45 TABLET | Refills: 2 | Status: SHIPPED | OUTPATIENT
Start: 2023-05-04

## 2023-05-08 SDOH — HEALTH STABILITY: PHYSICAL HEALTH: ON AVERAGE, HOW MANY DAYS PER WEEK DO YOU ENGAGE IN MODERATE TO STRENUOUS EXERCISE (LIKE A BRISK WALK)?: 6 DAYS

## 2023-05-08 SDOH — HEALTH STABILITY: PHYSICAL HEALTH: ON AVERAGE, HOW MANY MINUTES DO YOU ENGAGE IN EXERCISE AT THIS LEVEL?: 60 MIN

## 2023-05-10 ENCOUNTER — OFFICE VISIT (OUTPATIENT)
Dept: ORTHOPEDIC SURGERY | Age: 48
End: 2023-05-10

## 2023-05-10 VITALS — HEIGHT: 66 IN | WEIGHT: 197 LBS | BODY MASS INDEX: 31.66 KG/M2

## 2023-05-10 DIAGNOSIS — M79.672 LEFT FOOT PAIN: ICD-10-CM

## 2023-05-10 DIAGNOSIS — Q68.8 OS TRIGONUM SYNDROME: Primary | ICD-10-CM

## 2023-05-10 RX ORDER — NAPROXEN 500 MG/1
500 TABLET ORAL 2 TIMES DAILY WITH MEALS
Qty: 60 TABLET | Refills: 0 | Status: SHIPPED | OUTPATIENT
Start: 2023-05-10 | End: 2023-06-09

## 2023-05-10 NOTE — PROGRESS NOTES
CHIEF COMPLAINT: Left lateral midfoot pain/ Os peroneum. HISTORY:  Ms. Camilo Carrera 50 y.o.  female presents today for the first visit for evaluation of left lateral midfoot pain which started mid April after she tripped on a deck step at her friend's house. She is complaining of sharp pain, 9/10. Pain is increase with standing and walking, and decrease with rest. Pain is sharp with first few steps, dull achy pain by the end of the day. The pain radiates to lateral leg, and no numbness and tingling sensation. No other complaint. Past Medical History:   Diagnosis Date    Anxiety     Asthma, exercise induced     Hyperlipidemia, mixed     Lumbar radiculopathy     Major depression single episode, in partial remission (HCC)     Migraine without aura and without status migrainosus, not intractable        Past Surgical History:   Procedure Laterality Date    TONSILLECTOMY  Age 9    WISDOM TOOTH EXTRACTION         Social History     Socioeconomic History    Marital status:       Spouse name: Not on file    Number of children: Not on file    Years of education: Not on file    Highest education level: Not on file   Occupational History    Not on file   Tobacco Use    Smoking status: Never    Smokeless tobacco: Never   Substance and Sexual Activity    Alcohol use: Yes     Comment: Social    Drug use: No    Sexual activity: Not on file   Other Topics Concern    Not on file   Social History Narrative    Not on file     Social Determinants of Health     Financial Resource Strain: Not on file   Food Insecurity: Not on file   Transportation Needs: Not on file   Physical Activity: Sufficiently Active    Days of Exercise per Week: 6 days    Minutes of Exercise per Session: 60 min   Stress: Not on file   Social Connections: Not on file   Intimate Partner Violence: Not At Risk    Fear of Current or Ex-Partner: No    Emotionally Abused: No    Physically Abused: No    Sexually Abused: No   Housing Stability: Not on

## 2023-05-12 DIAGNOSIS — F41.9 ANXIETY: ICD-10-CM

## 2023-05-12 RX ORDER — ALPRAZOLAM 1 MG/1
TABLET ORAL
Qty: 90 TABLET | Refills: 0 | Status: SHIPPED | OUTPATIENT
Start: 2023-05-12 | End: 2023-06-11

## 2023-05-22 ENCOUNTER — OFFICE VISIT (OUTPATIENT)
Dept: FAMILY MEDICINE CLINIC | Age: 48
End: 2023-05-22
Payer: COMMERCIAL

## 2023-05-22 VITALS
SYSTOLIC BLOOD PRESSURE: 102 MMHG | DIASTOLIC BLOOD PRESSURE: 76 MMHG | HEIGHT: 66 IN | BODY MASS INDEX: 30.73 KG/M2 | WEIGHT: 191.2 LBS

## 2023-05-22 DIAGNOSIS — F32.4 MAJOR DEPRESSION SINGLE EPISODE, IN PARTIAL REMISSION (HCC): Primary | ICD-10-CM

## 2023-05-22 DIAGNOSIS — Z23 NEED FOR TDAP VACCINATION: ICD-10-CM

## 2023-05-22 DIAGNOSIS — F41.9 ANXIETY: ICD-10-CM

## 2023-05-22 PROCEDURE — 90715 TDAP VACCINE 7 YRS/> IM: CPT | Performed by: FAMILY MEDICINE

## 2023-05-22 PROCEDURE — 99213 OFFICE O/P EST LOW 20 MIN: CPT | Performed by: FAMILY MEDICINE

## 2023-05-22 PROCEDURE — 90471 IMMUNIZATION ADMIN: CPT | Performed by: FAMILY MEDICINE

## 2023-06-08 DIAGNOSIS — Q68.8 OS TRIGONUM SYNDROME: ICD-10-CM

## 2023-06-08 RX ORDER — NAPROXEN 500 MG/1
TABLET ORAL
Qty: 60 TABLET | Refills: 0 | Status: SHIPPED | OUTPATIENT
Start: 2023-06-08

## 2023-06-14 PROBLEM — M77.52 OS PERONEUM SYNDROME OF LEFT FOOT: Status: ACTIVE | Noted: 2023-06-14

## 2023-07-05 DIAGNOSIS — Q68.8 OS TRIGONUM SYNDROME: ICD-10-CM

## 2023-07-05 RX ORDER — NAPROXEN 500 MG/1
TABLET ORAL
Qty: 60 TABLET | Refills: 0 | Status: SHIPPED | OUTPATIENT
Start: 2023-07-05

## 2023-07-05 NOTE — TELEPHONE ENCOUNTER
6-  IMPRESSION: Left lateral midfoot pain/ Os peroneum. PLAN: I discussed with the patient all the treatment options, and natural history of Os peroneum. We recommended stretching exercises of the calf and perennial strengthening exercises which was taught to the patient today. She will take NSAIDS Naprosyn and change to as needed. She can discontinue the ankle brace and return to normal activity as tolerated. Follow-up as needed.      Last office visit 6/14/2023     Last written 6-8-2023 this will be her third refill     Next office visit scheduled Visit date not found    Requested Prescriptions     Pending Prescriptions Disp Refills    naproxen (NAPROSYN) 500 MG tablet [Pharmacy Med Name: NAPROXEN 500MG TABLETS] 60 tablet 0     Sig: TAKE 1 TABLET BY MOUTH TWICE DAILY WITH MEALS

## 2023-07-13 ENCOUNTER — HOSPITAL ENCOUNTER (EMERGENCY)
Age: 48
Discharge: HOME | End: 2023-07-13
Attending: EMERGENCY MEDICINE
Payer: COMMERCIAL

## 2023-07-13 VITALS
OXYGEN SATURATION: 95 % | RESPIRATION RATE: 18 BRPM | SYSTOLIC BLOOD PRESSURE: 127 MMHG | BODY MASS INDEX: 30.29 KG/M2 | TEMPERATURE: 98.4 F | DIASTOLIC BLOOD PRESSURE: 83 MMHG | WEIGHT: 188.49 LBS | HEIGHT: 66 IN | HEART RATE: 87 BPM

## 2023-07-13 DIAGNOSIS — F41.9 ANXIETY: Primary | ICD-10-CM

## 2023-07-13 DIAGNOSIS — T78.40XA ALLERGIC REACTION, INITIAL ENCOUNTER: Primary | ICD-10-CM

## 2023-07-13 PROCEDURE — 99283 EMERGENCY DEPT VISIT LOW MDM: CPT

## 2023-07-13 PROCEDURE — 6370000000 HC RX 637 (ALT 250 FOR IP): Performed by: EMERGENCY MEDICINE

## 2023-07-13 RX ORDER — PREDNISONE 50 MG/1
50 TABLET ORAL DAILY
Qty: 5 TABLET | Refills: 0 | Status: SHIPPED | OUTPATIENT
Start: 2023-07-13 | End: 2023-07-18

## 2023-07-13 RX ORDER — PREDNISONE 20 MG/1
60 TABLET ORAL ONCE
Status: COMPLETED | OUTPATIENT
Start: 2023-07-13 | End: 2023-07-13

## 2023-07-13 RX ORDER — EPINEPHRINE 0.3 MG/.3ML
0.3 INJECTION SUBCUTANEOUS ONCE
Qty: 0.3 ML | Refills: 0 | Status: SHIPPED | OUTPATIENT
Start: 2023-07-13 | End: 2023-07-13

## 2023-07-13 RX ORDER — DIPHENHYDRAMINE HCL 25 MG
50 TABLET ORAL ONCE
Status: COMPLETED | OUTPATIENT
Start: 2023-07-13 | End: 2023-07-13

## 2023-07-13 RX ORDER — ALPRAZOLAM 1 MG/1
TABLET ORAL
Qty: 90 TABLET | Refills: 0 | Status: SHIPPED | OUTPATIENT
Start: 2023-07-13 | End: 2023-08-12

## 2023-07-13 RX ORDER — ALPRAZOLAM 1 MG/1
3 TABLET ORAL NIGHTLY PRN
COMMUNITY
End: 2023-07-13

## 2023-07-13 RX ADMIN — DIPHENHYDRAMINE HCL 50 MG: 25 TABLET ORAL at 01:19

## 2023-07-13 RX ADMIN — PREDNISONE 60 MG: 20 TABLET ORAL at 01:19

## 2023-07-13 ASSESSMENT — PAIN SCALES - GENERAL
PAINLEVEL_OUTOF10: 0
PAINLEVEL_OUTOF10: 0

## 2023-07-13 ASSESSMENT — PAIN - FUNCTIONAL ASSESSMENT: PAIN_FUNCTIONAL_ASSESSMENT: 0-10

## 2023-07-13 ASSESSMENT — LIFESTYLE VARIABLES: HOW OFTEN DO YOU HAVE A DRINK CONTAINING ALCOHOL: 2-4 TIMES A MONTH

## 2023-07-13 NOTE — ED PROVIDER NOTES
I PERSONALLY SAW THE PATIENT AND PERFORMED A SUBSTANTIVE PORTION OF THE VISIT INCLUDING ALL ASPECTS OF THE MEDICAL DECISION MAKING PROCESS. [address] [facility] [address] [address]      Pt Name: Belen Hager  MRN: [id number]  [address] [facility] 1975  Date of evaluation: 7/13/2023  Provider: Monty Montoya MD    CHIEF COMPLAINT       Chief Complaint   Patient presents with    Allergic Reaction     Pt states that she started itching all over about 90 minutes ago. Pt states that she then noticed swelling in her tongue and lips. Pt denies using  or eating anything new today. HISTORY OF PRESENT ILLNESS    Belen Hager is a 50 y.o. female who presents to the emergency department with reaction. Patient presents with allergic reaction from home. Positive for itchiness and rash all over her body. Positive for hives. Positive for facial swelling. No airway involvement. No chest pain or shortness of breath. No new exposures. No other associated symptoms. No abdominal pain or back pain. No leg pain. No constipation. No sore throat. Nursing Notes were reviewed. Including nursing noted for FM, Surgical History, Past Medical History, Social History, vitals, and allergies; agree with all. REVIEW OF SYSTEMS       Review of Systems    Except as noted above the remainder of the review of systems was reviewed and negative.      PAST MEDICAL HISTORY     Past Medical History:   Diagnosis Date    Anxiety     Asthma, exercise induced     Hyperlipidemia, mixed     Lumbar radiculopathy     Major depression single episode, in partial remission (HCC)     Migraine without aura and without status migrainosus, not intractable        SURGICAL HISTORY       Past Surgical History:   Procedure Laterality Date    TONSILLECTOMY  Age 9    WISDOM TOOTH EXTRACTION         CURRENT MEDICATIONS       Previous Medications    ALPRAZOLAM (XANAX) 1 MG TABLET    Take 3 tablets by mouth nightly

## 2023-07-13 NOTE — ED NOTES
Discharge and education instructions reviewed. Patient verbalized understanding, teach-back successful. Patient denied questions at this time. No acute distress noted. Patient instructed to follow-up as noted - return to emergency department if symptoms worsen. Patient verbalized understanding. Discharged per EDMD with discharge instructions.         Viviano Gitelman, RN  07/13/23 1927

## 2023-07-13 NOTE — TELEPHONE ENCOUNTER
Last office visit 5/22/2023       Next office visit scheduled 8/15/2023    Requested Prescriptions     Pending Prescriptions Disp Refills    ALPRAZolam (XANAX) 1 MG tablet [Pharmacy Med Name: ALPRAZOLAM 1MG TABLETS] 90 tablet      Sig: TAKE 1 TABLET BY MOUTH THREE TIMES DAILY AS NEEDED FOR ANXIETY OR SLEEP

## 2023-07-13 NOTE — ED TRIAGE NOTES
Pt states that she started itching all over about 90 minutes ago. Pt states that she then noticed swelling in her tongue and lips. Pt denies using  or eating anything new today. Pt denies any difficulty breathing.

## 2023-08-02 DIAGNOSIS — Q68.8 OS TRIGONUM SYNDROME: ICD-10-CM

## 2023-08-02 RX ORDER — NAPROXEN 500 MG/1
TABLET ORAL
Qty: 60 TABLET | Refills: 0 | Status: SHIPPED | OUTPATIENT
Start: 2023-08-02

## 2023-08-07 RX ORDER — SERTRALINE HYDROCHLORIDE 100 MG/1
TABLET, FILM COATED ORAL
Qty: 45 TABLET | Refills: 0 | Status: SHIPPED | OUTPATIENT
Start: 2023-08-07 | End: 2023-08-31

## 2023-08-13 NOTE — PROGRESS NOTES
Cologuard Test  RTO 3 months for Depression / Anxiety       Controlled Substance Monitoring:    Acute and Chronic Pain Monitoring:   RX Monitoring 8/15/2023   Attestation -   Periodic Controlled Substance Monitoring No signs of potential drug abuse or diversion identified.                                   [address], DO

## 2023-08-14 SDOH — ECONOMIC STABILITY: INCOME INSECURITY: HOW HARD IS IT FOR YOU TO PAY FOR THE VERY BASICS LIKE FOOD, HOUSING, MEDICAL CARE, AND HEATING?: NOT HARD AT ALL

## 2023-08-14 SDOH — ECONOMIC STABILITY: FOOD INSECURITY: WITHIN THE PAST 12 MONTHS, THE FOOD YOU BOUGHT JUST DIDN'T LAST AND YOU DIDN'T HAVE MONEY TO GET MORE.: NEVER TRUE

## 2023-08-14 SDOH — ECONOMIC STABILITY: HOUSING INSECURITY
IN THE LAST 12 MONTHS, WAS THERE A TIME WHEN YOU DID NOT HAVE A STEADY PLACE TO SLEEP OR SLEPT IN A SHELTER (INCLUDING NOW)?: NO

## 2023-08-14 SDOH — ECONOMIC STABILITY: FOOD INSECURITY: WITHIN THE PAST 12 MONTHS, YOU WORRIED THAT YOUR FOOD WOULD RUN OUT BEFORE YOU GOT MONEY TO BUY MORE.: NEVER TRUE

## 2023-08-14 SDOH — ECONOMIC STABILITY: TRANSPORTATION INSECURITY
IN THE PAST 12 MONTHS, HAS LACK OF TRANSPORTATION KEPT YOU FROM MEETINGS, WORK, OR FROM GETTING THINGS NEEDED FOR DAILY LIVING?: NO

## 2023-08-15 ENCOUNTER — OFFICE VISIT (OUTPATIENT)
Dept: FAMILY MEDICINE CLINIC | Age: 48
End: 2023-08-15
Payer: COMMERCIAL

## 2023-08-15 VITALS
BODY MASS INDEX: 28.67 KG/M2 | SYSTOLIC BLOOD PRESSURE: 106 MMHG | DIASTOLIC BLOOD PRESSURE: 74 MMHG | WEIGHT: 178.4 LBS | HEIGHT: 66 IN

## 2023-08-15 DIAGNOSIS — T78.40XA ALLERGIC REACTION, INITIAL ENCOUNTER: ICD-10-CM

## 2023-08-15 DIAGNOSIS — F41.9 ANXIETY: ICD-10-CM

## 2023-08-15 DIAGNOSIS — F32.4 MAJOR DEPRESSION SINGLE EPISODE, IN PARTIAL REMISSION (HCC): Primary | ICD-10-CM

## 2023-08-15 PROCEDURE — 99213 OFFICE O/P EST LOW 20 MIN: CPT | Performed by: FAMILY MEDICINE

## 2023-08-15 RX ORDER — ALPRAZOLAM 1 MG/1
TABLET ORAL
Qty: 90 TABLET | Refills: 0 | Status: SHIPPED | OUTPATIENT
Start: 2023-08-15 | End: 2023-09-14

## 2023-08-31 RX ORDER — SERTRALINE HYDROCHLORIDE 100 MG/1
TABLET, FILM COATED ORAL
Qty: 45 TABLET | Refills: 1 | Status: SHIPPED | OUTPATIENT
Start: 2023-08-31 | End: 2023-10-22

## 2023-08-31 NOTE — TELEPHONE ENCOUNTER
Last office visit 8/15/2023       Next office visit scheduled 11/14/2023    Requested Prescriptions     Pending Prescriptions Disp Refills    sertraline (ZOLOFT) 100 MG tablet [Pharmacy Med Name: SERTRALINE 100MG TABLETS] 45 tablet 0     Sig: TAKE 1 AND 1/2 TABLETS BY MOUTH DAILY Quality 110: Preventive Care And Screening: Influenza Immunization: Influenza Immunization Ordered or Recommended, but not Administered due to system reason Quality 226: Preventive Care And Screening: Tobacco Use: Screening And Cessation Intervention: Patient screened for tobacco and never smoked Quality 431: Preventive Care And Screening: Unhealthy Alcohol Use - Screening: Patient screened for unhealthy alcohol use using a single question and scores less than 2 times per year Detail Level: Detailed Quality 47: Advance Care Plan: Advance Care Planning discussed and documented; advance care plan or surrogate decision maker documented in the medical record. Quality 111:Pneumonia Vaccination Status For Older Adults: Pneumococcal Vaccination Previously Received

## 2023-09-26 DIAGNOSIS — F41.9 ANXIETY: ICD-10-CM

## 2023-09-26 RX ORDER — ALPRAZOLAM 1 MG/1
TABLET ORAL
Qty: 90 TABLET | Refills: 0 | Status: SHIPPED | OUTPATIENT
Start: 2023-09-26 | End: 2023-10-26

## 2023-09-26 NOTE — TELEPHONE ENCOUNTER
Last office visit 8/15/2023       Next office visit scheduled 11/14/2023    Requested Prescriptions     Pending Prescriptions Disp Refills    ALPRAZolam (XANAX) 1 MG tablet [Pharmacy Med Name: ALPRAZOLAM 1MG TABLETS] 90 tablet      Sig: TAKE 1 TABLET BY MOUTH THREE TIMES DAILY AS NEEDED FOR ANXIETY OR SLEEP

## 2023-11-03 DIAGNOSIS — F41.9 ANXIETY: ICD-10-CM

## 2023-11-03 RX ORDER — ALPRAZOLAM 1 MG/1
TABLET ORAL
Qty: 90 TABLET | Refills: 0 | Status: SHIPPED | OUTPATIENT
Start: 2023-11-03 | End: 2023-12-03

## 2023-11-17 NOTE — PROGRESS NOTES
ill-appearing or toxic-appearing. HENT:      Head: Normocephalic. Pulmonary:      Effort: Pulmonary effort is normal.   Neurological:      Mental Status: She is alert and oriented to person, place, and time. Psychiatric:         Mood and Affect: Mood normal.         Behavior: Behavior normal.         Thought Content: Thought content normal.         Judgment: Judgment normal.         Assessment:      Depression   Anxiety       Plan:      OARRS report was reviewed  Medications refilled   I recommended the Bivalent COVID vaccine. Reminded patient to have a GYN exam and do the Cologuard Test  RTO 3 months for Depression / Anxiety       Controlled Substance Monitoring:    Acute and Chronic Pain Monitoring:   RX Monitoring Periodic Controlled Substance Monitoring   8/15/2023   8:43 AM No signs of potential drug abuse or diversion identified.                                        [address] Amanda, DO

## 2023-11-20 ENCOUNTER — TELEMEDICINE (OUTPATIENT)
Dept: FAMILY MEDICINE CLINIC | Age: 48
End: 2023-11-20
Payer: COMMERCIAL

## 2023-11-20 DIAGNOSIS — F32.4 MAJOR DEPRESSION SINGLE EPISODE, IN PARTIAL REMISSION (HCC): Primary | ICD-10-CM

## 2023-11-20 DIAGNOSIS — F41.9 ANXIETY: ICD-10-CM

## 2023-11-20 PROCEDURE — 99213 OFFICE O/P EST LOW 20 MIN: CPT | Performed by: FAMILY MEDICINE

## 2023-12-01 DIAGNOSIS — F41.9 ANXIETY: ICD-10-CM

## 2023-12-03 RX ORDER — ALPRAZOLAM 1 MG/1
TABLET ORAL
Qty: 90 TABLET | Refills: 0 | Status: SHIPPED | OUTPATIENT
Start: 2023-12-03 | End: 2024-01-02

## 2024-01-04 DIAGNOSIS — F41.9 ANXIETY: ICD-10-CM

## 2024-01-04 RX ORDER — ALPRAZOLAM 1 MG/1
TABLET ORAL
Qty: 90 TABLET | Refills: 0 | Status: SHIPPED | OUTPATIENT
Start: 2024-01-04 | End: 2024-02-03

## 2024-02-12 DIAGNOSIS — F41.9 ANXIETY: ICD-10-CM

## 2024-02-12 RX ORDER — ALPRAZOLAM 1 MG/1
TABLET ORAL
Qty: 90 TABLET | Refills: 0 | Status: SHIPPED | OUTPATIENT
Start: 2024-02-12 | End: 2024-03-13

## 2024-02-21 NOTE — PROGRESS NOTES
Subjective:      Patient ID: Winifred Christianson is a 48 y.o. female.    HPI    Depression: Patient's   by choking on food in .  She takes Zoloft 150 mg daily and Zyprexa 5 mg nightly.  She saw a counselor once weekly (but not since the  holidays).  She feels that the medication is helping with her symptoms.  She is sleeping fair and denies any suicidal ideation.  She feels that she still needs to be on the medication.       Anxiety:  Patient takes Xanax 1 mg every 8 hrs as needed.  She feels that the medication helps to control her anxiety.  She has cut the Xanax back to about once daily.  She would like to start weaning off.      Review of Systems   Constitutional:  Negative for chills and fever.   Psychiatric/Behavioral:  Positive for dysphoric mood and sleep disturbance. Negative for suicidal ideas. The patient is nervous/anxious.      /82   Ht 1.676 m (5' 6\")   Wt 71.2 kg (157 lb)   BMI 25.34 kg/m²    Objective:   Physical Exam  Constitutional:       General: She is not in acute distress.     Appearance: She is well-developed.   HENT:      Head: Normocephalic.      Right Ear: External ear normal.      Left Ear: External ear normal.      Mouth/Throat:      Pharynx: No oropharyngeal exudate.   Neck:      Thyroid: No thyromegaly.      Vascular: No JVD.   Cardiovascular:      Rate and Rhythm: Normal rate and regular rhythm.      Heart sounds: Normal heart sounds. No murmur heard.  Pulmonary:      Effort: Pulmonary effort is normal.      Breath sounds: Normal breath sounds. No wheezing or rales.   Lymphadenopathy:      Cervical: No cervical adenopathy.   Neurological:      Mental Status: She is alert and oriented to person, place, and time.         Assessment:      Depression   Anxiety       Plan:      Decrease the Xanax to 0.5 mg TID as needed.  Wean further as able.    OARRS report was reviewed  Medications refilled    Referral to Dr. Hector for Colonoscopy   Patient had a GYN exam in

## 2024-02-26 ENCOUNTER — OFFICE VISIT (OUTPATIENT)
Dept: FAMILY MEDICINE CLINIC | Age: 49
End: 2024-02-26
Payer: COMMERCIAL

## 2024-02-26 VITALS
DIASTOLIC BLOOD PRESSURE: 82 MMHG | WEIGHT: 157 LBS | HEIGHT: 66 IN | BODY MASS INDEX: 25.23 KG/M2 | SYSTOLIC BLOOD PRESSURE: 122 MMHG

## 2024-02-26 DIAGNOSIS — F41.9 ANXIETY: ICD-10-CM

## 2024-02-26 DIAGNOSIS — F32.4 MAJOR DEPRESSION SINGLE EPISODE, IN PARTIAL REMISSION (HCC): Primary | ICD-10-CM

## 2024-02-26 DIAGNOSIS — Z12.11 SCREEN FOR COLON CANCER: ICD-10-CM

## 2024-02-26 PROCEDURE — 99213 OFFICE O/P EST LOW 20 MIN: CPT | Performed by: FAMILY MEDICINE

## 2024-02-26 ASSESSMENT — PATIENT HEALTH QUESTIONNAIRE - PHQ9
1. LITTLE INTEREST OR PLEASURE IN DOING THINGS: 0
SUM OF ALL RESPONSES TO PHQ QUESTIONS 1-9: 0
10. IF YOU CHECKED OFF ANY PROBLEMS, HOW DIFFICULT HAVE THESE PROBLEMS MADE IT FOR YOU TO DO YOUR WORK, TAKE CARE OF THINGS AT HOME, OR GET ALONG WITH OTHER PEOPLE: 0
2. FEELING DOWN, DEPRESSED OR HOPELESS: 0
5. POOR APPETITE OR OVEREATING: 0
SUM OF ALL RESPONSES TO PHQ QUESTIONS 1-9: 0
6. FEELING BAD ABOUT YOURSELF - OR THAT YOU ARE A FAILURE OR HAVE LET YOURSELF OR YOUR FAMILY DOWN: 0
8. MOVING OR SPEAKING SO SLOWLY THAT OTHER PEOPLE COULD HAVE NOTICED. OR THE OPPOSITE, BEING SO FIGETY OR RESTLESS THAT YOU HAVE BEEN MOVING AROUND A LOT MORE THAN USUAL: 0
9. THOUGHTS THAT YOU WOULD BE BETTER OFF DEAD, OR OF HURTING YOURSELF: 0
3. TROUBLE FALLING OR STAYING ASLEEP: 0
SUM OF ALL RESPONSES TO PHQ QUESTIONS 1-9: 0
SUM OF ALL RESPONSES TO PHQ QUESTIONS 1-9: 0
4. FEELING TIRED OR HAVING LITTLE ENERGY: 0
7. TROUBLE CONCENTRATING ON THINGS, SUCH AS READING THE NEWSPAPER OR WATCHING TELEVISION: 0
SUM OF ALL RESPONSES TO PHQ9 QUESTIONS 1 & 2: 0

## 2024-03-07 RX ORDER — SERTRALINE HYDROCHLORIDE 100 MG/1
TABLET, FILM COATED ORAL
Qty: 45 TABLET | Refills: 0 | Status: SHIPPED | OUTPATIENT
Start: 2024-03-07

## 2024-03-13 DIAGNOSIS — F41.9 ANXIETY: ICD-10-CM

## 2024-03-13 RX ORDER — ALPRAZOLAM 1 MG/1
TABLET ORAL
Qty: 90 TABLET | Refills: 0 | Status: SHIPPED | OUTPATIENT
Start: 2024-03-13 | End: 2024-04-12

## 2024-03-13 NOTE — TELEPHONE ENCOUNTER
Last office visit 2/26/2024       Next office visit scheduled 6/3/2024    Requested Prescriptions     Pending Prescriptions Disp Refills    ALPRAZolam (XANAX) 1 MG tablet [Pharmacy Med Name: ALPRAZOLAM 1MG TABLETS] 90 tablet      Sig: TAKE 1 TABLET BY MOUTH THREE TIMES DAILY AS NEEDED FOR ANXIETY OR SLEEP

## 2024-04-09 RX ORDER — SERTRALINE HYDROCHLORIDE 100 MG/1
TABLET, FILM COATED ORAL
Qty: 45 TABLET | Refills: 3 | Status: SHIPPED | OUTPATIENT
Start: 2024-04-09

## 2024-04-14 DIAGNOSIS — F41.9 ANXIETY: ICD-10-CM

## 2024-04-14 RX ORDER — ALPRAZOLAM 1 MG/1
TABLET ORAL
Qty: 90 TABLET | Refills: 0 | Status: SHIPPED | OUTPATIENT
Start: 2024-04-14 | End: 2024-05-14

## 2024-05-13 DIAGNOSIS — F41.9 ANXIETY: ICD-10-CM

## 2024-05-13 RX ORDER — ALPRAZOLAM 1 MG/1
TABLET ORAL
Qty: 90 TABLET | Refills: 0 | Status: SHIPPED | OUTPATIENT
Start: 2024-05-13 | End: 2024-06-12

## 2024-06-01 NOTE — PROGRESS NOTES
Subjective:      Patient ID: Winifred Christianson is a 49 y.o. female.    HPI   Winifred Christianson, was evaluated through a synchronous (real-time) audio-video encounter. The patient (or guardian if applicable) is aware that this is a billable service, which includes applicable co-pays. This Virtual Visit was conducted with patient's (and/or legal guardian's) consent. Patient identification was verified, and a caregiver was present when appropriate.   The patient was located at Home: [address] [place] [id number]  Provider was located at Facility (Appt Dept): [address] [facility]  [address]  [place] [address]  Confirm you are appropriately licensed, registered, or certified to deliver care in the state where the patient is located as indicated above. If you are not or unsure, please re-schedule the visit: Yes, I confirm.      Total time spent for this encounter: Not billed by time    --MILVIA PELAYO,  on 6/3/2024 at 4:08 PM    An electronic signature was used to authenticate this note.    Depression: Patient's   by choking on food in .  She takes Zoloft 150 mg daily.  She has now stopped the Zyprexa.  She saw a counselor once weekly (but not since the  holidays).  She feels that the medication is helping with her symptoms.  She is sleeping fair and denies any suicidal ideation.  She feels that she still needs to be on the medication.         Anxiety:  Patient takes Xanax 1 mg every 8 hrs as needed.  She feels that the medication helps to control her anxiety.  She has cut the Xanax back to about once daily.  She would like to continue weaning off.   She would like to decrease to the 0.5 mg dose.     Review of Systems   Constitutional:  Negative for chills and fever.   Psychiatric/Behavioral:  Positive for dysphoric mood and sleep disturbance. Negative for suicidal ideas. The patient is nervous/anxious.      BP Readings from Last 3 Encounters:   24 122/82   08/15/23 106/74

## 2024-06-03 ENCOUNTER — TELEMEDICINE (OUTPATIENT)
Dept: FAMILY MEDICINE CLINIC | Age: 49
End: 2024-06-03
Payer: COMMERCIAL

## 2024-06-03 DIAGNOSIS — F41.9 ANXIETY: Primary | ICD-10-CM

## 2024-06-03 DIAGNOSIS — Z12.11 SCREEN FOR COLON CANCER: ICD-10-CM

## 2024-06-03 DIAGNOSIS — Z12.31 ENCOUNTER FOR SCREENING MAMMOGRAM FOR MALIGNANT NEOPLASM OF BREAST: ICD-10-CM

## 2024-06-03 PROCEDURE — 99213 OFFICE O/P EST LOW 20 MIN: CPT | Performed by: FAMILY MEDICINE

## 2024-06-13 DIAGNOSIS — F41.9 ANXIETY: ICD-10-CM

## 2024-06-13 NOTE — TELEPHONE ENCOUNTER
Last office visit 6/3/2024       Next office visit scheduled Visit date not found    Requested Prescriptions     Pending Prescriptions Disp Refills    ALPRAZolam (XANAX) 1 MG tablet [Pharmacy Med Name: ALPRAZOLAM 1MG TABLETS] 90 tablet      Sig: TAKE 1 TABLET BY MOUTH THREE TIMES DAILY AS NEEDED FOR ANXIETY OR SLEEP

## 2024-06-14 RX ORDER — ALPRAZOLAM 1 MG/1
TABLET ORAL
Qty: 90 TABLET | Refills: 0 | Status: SHIPPED | OUTPATIENT
Start: 2024-06-14 | End: 2024-07-14

## 2024-07-11 NOTE — TELEPHONE ENCOUNTER
Last office visit 8/18/2022     Last written     Next office visit scheduled 11/21/2022    Requested Prescriptions     Pending Prescriptions Disp Refills    ALPRAZolam (XANAX) 1 MG tablet [Pharmacy Med Name: ALPRAZOLAM 1MG TABLETS] 90 tablet      Sig: TAKE 1 TABLET BY MOUTH THREE TIMES DAILY AS NEEDED FOR ANXIETY OR SLEEP Yes

## 2024-07-15 DIAGNOSIS — F41.9 ANXIETY: ICD-10-CM

## 2024-07-15 RX ORDER — ALPRAZOLAM 1 MG/1
TABLET ORAL
Qty: 90 TABLET | Refills: 0 | Status: SHIPPED | OUTPATIENT
Start: 2024-07-15 | End: 2024-08-14

## 2024-08-12 RX ORDER — SERTRALINE HYDROCHLORIDE 100 MG/1
TABLET, FILM COATED ORAL
Qty: 45 TABLET | Refills: 3 | Status: SHIPPED | OUTPATIENT
Start: 2024-08-12

## 2024-08-15 DIAGNOSIS — F41.9 ANXIETY: ICD-10-CM

## 2024-08-15 RX ORDER — ALPRAZOLAM 1 MG/1
TABLET ORAL
Qty: 90 TABLET | Refills: 0 | Status: SHIPPED | OUTPATIENT
Start: 2024-08-15 | End: 2024-09-14

## 2024-08-28 NOTE — PROGRESS NOTES
Subjective:      Patient ID: Winifred Christianson is a 49 y.o. female.    HPI    Depression: Patient's   by choking on food in .  She takes Zoloft 150 mg daily.  She has now stopped the Zyprexa.  She saw a counselor once weekly (but not since the  holidays).  She feels that the medication is helping with her symptoms.  She is sleeping fair and denies any suicidal ideation.  She feels that she still needs to be on the medication.          Anxiety:  Patient takes Xanax 1 mg every 8 hrs as needed.  She feels that the medication helps to control her anxiety.  She has cut the Xanax back to about once daily.  She would like to continue weaning off.   She would like to decrease to the 0.5 mg dose.     Review of Systems   Constitutional:  Negative for chills and fever.   Psychiatric/Behavioral:  Positive for dysphoric mood. Negative for suicidal ideas. The patient is nervous/anxious.      /70   Wt 54.3 kg (119 lb 12.8 oz)   BMI 19.34 kg/m²    Objective:   Physical Exam  Constitutional:       General: She is not in acute distress.     Appearance: She is well-developed.   HENT:      Head: Normocephalic.      Right Ear: External ear normal.      Left Ear: External ear normal.      Mouth/Throat:      Pharynx: No oropharyngeal exudate.   Neck:      Thyroid: No thyromegaly.      Vascular: No JVD.   Cardiovascular:      Rate and Rhythm: Normal rate and regular rhythm.      Heart sounds: Normal heart sounds. No murmur heard.  Pulmonary:      Effort: Pulmonary effort is normal.      Breath sounds: Normal breath sounds. No wheezing or rales.   Lymphadenopathy:      Cervical: No cervical adenopathy.   Neurological:      Mental Status: She is alert and oriented to person, place, and time.         Assessment:      Depression   Anxiety       Plan:   Assessment & Plan   [facility] report was reviewed  Medications refilled   Cologuard Test Ordered   Reminded patient to have a GYN exam (Will go late October).

## 2024-08-29 ENCOUNTER — OFFICE VISIT (OUTPATIENT)
Dept: FAMILY MEDICINE CLINIC | Age: 49
End: 2024-08-29
Payer: COMMERCIAL

## 2024-08-29 VITALS — BODY MASS INDEX: 19.34 KG/M2 | DIASTOLIC BLOOD PRESSURE: 70 MMHG | WEIGHT: 119.8 LBS | SYSTOLIC BLOOD PRESSURE: 102 MMHG

## 2024-08-29 DIAGNOSIS — F41.9 ANXIETY: ICD-10-CM

## 2024-08-29 DIAGNOSIS — F32.4 MAJOR DEPRESSION SINGLE EPISODE, IN PARTIAL REMISSION (HCC): Primary | ICD-10-CM

## 2024-08-29 DIAGNOSIS — Z12.11 SCREEN FOR COLON CANCER: ICD-10-CM

## 2024-08-29 PROCEDURE — 99213 OFFICE O/P EST LOW 20 MIN: CPT | Performed by: FAMILY MEDICINE

## 2024-08-29 SDOH — ECONOMIC STABILITY: FOOD INSECURITY: WITHIN THE PAST 12 MONTHS, THE FOOD YOU BOUGHT JUST DIDN'T LAST AND YOU DIDN'T HAVE MONEY TO GET MORE.: NEVER TRUE

## 2024-08-29 SDOH — ECONOMIC STABILITY: FOOD INSECURITY: WITHIN THE PAST 12 MONTHS, YOU WORRIED THAT YOUR FOOD WOULD RUN OUT BEFORE YOU GOT MONEY TO BUY MORE.: NEVER TRUE

## 2024-08-29 SDOH — ECONOMIC STABILITY: INCOME INSECURITY: HOW HARD IS IT FOR YOU TO PAY FOR THE VERY BASICS LIKE FOOD, HOUSING, MEDICAL CARE, AND HEATING?: NOT HARD AT ALL

## 2024-09-15 DIAGNOSIS — F41.9 ANXIETY: ICD-10-CM

## 2024-09-15 RX ORDER — ALPRAZOLAM 1 MG
TABLET ORAL
Qty: 90 TABLET | Refills: 0 | Status: SHIPPED | OUTPATIENT
Start: 2024-09-15 | End: 2024-10-15

## 2024-10-15 DIAGNOSIS — F41.9 ANXIETY: ICD-10-CM

## 2024-10-15 RX ORDER — ALPRAZOLAM 1 MG
TABLET ORAL
Qty: 75 TABLET | Refills: 0 | Status: SHIPPED | OUTPATIENT
Start: 2024-10-15 | End: 2024-11-14

## 2024-10-15 NOTE — TELEPHONE ENCOUNTER
Last office visit 8/29/2024     Last written      Next office visit scheduled 11/27/2024    Requested Prescriptions     Pending Prescriptions Disp Refills    ALPRAZolam (XANAX) 1 MG tablet [Pharmacy Med Name: ALPRAZOLAM 1MG TABLETS] 75 tablet      Sig: TAKE 1 TABLET BY MOUTH THREE TIMES DAILY AS NEEDED FOR ANXIETY OR SLEEP

## 2024-11-24 DIAGNOSIS — F41.9 ANXIETY: ICD-10-CM

## 2024-11-24 RX ORDER — ALPRAZOLAM 1 MG/1
TABLET ORAL
Qty: 90 TABLET | Refills: 0 | Status: SHIPPED | OUTPATIENT
Start: 2024-11-24 | End: 2024-12-24

## 2024-11-26 NOTE — PROGRESS NOTES
Subjective:      Patient ID: Winifred Christianson is a 49 y.o. female.    HPI    Depression: Patient's   by choking on food in .  She takes Zoloft 150 mg daily.  She has now stopped the Zyprexa.  She saw a counselor once weekly (but not since the  holidays).  She feels that the medication is helping with her symptoms.  She is sleeping fair and denies any suicidal ideation.  She feels that she still needs to be on the medication.          Anxiety:  Patient takes Xanax 1 mg every 8 hrs as needed.  She feels that the medication helps to control her anxiety.  She has cut the Xanax back to about once daily.  She would like to continue weaning off.   She would like to decrease to the 0.5 mg dose.     Review of Systems   Constitutional:  Negative for chills and fever.   Psychiatric/Behavioral:  Positive for dysphoric mood. Negative for suicidal ideas. The patient is nervous/anxious.      BP Readings from Last 3 Encounters:   24 102/70   24 122/82   08/15/23 106/74      There were no vitals taken for this visit.   Objective:   Physical Exam  Constitutional:       General: She is not in acute distress.     Appearance: She is well-developed.   HENT:      Head: Normocephalic.      Right Ear: External ear normal.      Left Ear: External ear normal.      Mouth/Throat:      Pharynx: No oropharyngeal exudate.   Neck:      Thyroid: No thyromegaly.      Vascular: No JVD.   Cardiovascular:      Rate and Rhythm: Normal rate and regular rhythm.      Heart sounds: Normal heart sounds. No murmur heard.  Pulmonary:      Effort: Pulmonary effort is normal.      Breath sounds: Normal breath sounds. No wheezing or rales.   Lymphadenopathy:      Cervical: No cervical adenopathy.   Neurological:      Mental Status: She is alert and oriented to person, place, and time.         Assessment:      Depression   Anxiety       Plan:   Assessment & Plan   [facility] report was reviewed  Medications refilled   Reminded

## 2024-11-27 ENCOUNTER — TELEMEDICINE (OUTPATIENT)
Dept: FAMILY MEDICINE CLINIC | Age: 49
End: 2024-11-27

## 2024-11-27 DIAGNOSIS — F32.4 MAJOR DEPRESSION SINGLE EPISODE, IN PARTIAL REMISSION (HCC): Primary | ICD-10-CM

## 2024-11-27 DIAGNOSIS — Z23 NEED FOR INFLUENZA VACCINATION: ICD-10-CM

## 2024-11-27 DIAGNOSIS — F41.9 ANXIETY: ICD-10-CM

## 2025-01-02 DIAGNOSIS — F41.9 ANXIETY: ICD-10-CM

## 2025-01-02 RX ORDER — ALPRAZOLAM 1 MG/1
TABLET ORAL
Qty: 90 TABLET | Refills: 0 | Status: SHIPPED | OUTPATIENT
Start: 2025-01-02 | End: 2025-02-01

## 2025-01-02 NOTE — TELEPHONE ENCOUNTER
Last office visit 11/27/2024     Last written      Next office visit scheduled Visit date not found    Requested Prescriptions     Pending Prescriptions Disp Refills    ALPRAZolam (XANAX) 1 MG tablet [Pharmacy Med Name: ALPRAZOLAM 1MG TABLETS] 90 tablet      Sig: TAKE 1 TABLET BY MOUTH THREE TIMES DAILY AS NEEDED FOR ANXIETY OR SLEEP

## 2025-01-25 NOTE — TELEPHONE ENCOUNTER
Last office visit 11/20/2023     Last written      Next office visit scheduled Visit date not found    Requested Prescriptions     Pending Prescriptions Disp Refills    ALPRAZolam (XANAX) 1 MG tablet [Pharmacy Med Name: ALPRAZOLAM 1MG TABLETS] 90 tablet      Sig: TAKE 1 TABLET BY MOUTH THREE TIMES DAILY AS NEEDED FOR ANXIETY OR SLEEP             
No

## 2025-03-04 NOTE — PROGRESS NOTES
Objective:   Physical Exam  Constitutional:       General: She is not in acute distress.     Appearance: She is well-developed.   HENT:      Head: Normocephalic.      Right Ear: External ear normal.      Left Ear: External ear normal.      Mouth/Throat:      Pharynx: No oropharyngeal exudate.   Neck:      Thyroid: No thyromegaly.      Vascular: No JVD.   Cardiovascular:      Rate and Rhythm: Normal rate and regular rhythm.      Heart sounds: Normal heart sounds. No murmur heard.  Pulmonary:      Effort: Pulmonary effort is normal.      Breath sounds: Normal breath sounds. No wheezing or rales.   Lymphadenopathy:      Cervical: No cervical adenopathy.   Neurological:      Mental Status: She is alert and oriented to person, place, and time.         Assessment:      Depression   Anxiety       Plan:   Assessment & Plan   [facility] report was reviewed  Decrease Xanax to 0.5 mg TID prn anxiety.    Medications refilled   Patient had a GYN exam in October 2024.    RTO 3 months for Anxiety       Controlled Substance Monitoring:    Acute and Chronic Pain Monitoring:   RX Monitoring Periodic Controlled Substance Monitoring   3/5/2025   3:14 PM Assessed functional status (ability to engage in work or other purposeful activities, the pain intensity and its interference with activities of daily living, quality of family life and social activities, and the physical activity)                             MILVIA PELAYO,

## 2025-03-05 ENCOUNTER — TELEMEDICINE (OUTPATIENT)
Dept: FAMILY MEDICINE CLINIC | Age: 50
End: 2025-03-05

## 2025-03-05 DIAGNOSIS — F32.4 MAJOR DEPRESSION SINGLE EPISODE, IN PARTIAL REMISSION: Primary | ICD-10-CM

## 2025-03-05 DIAGNOSIS — F41.9 ANXIETY: ICD-10-CM

## 2025-03-05 RX ORDER — ALPRAZOLAM 0.5 MG
0.5 TABLET ORAL 3 TIMES DAILY PRN
Qty: 90 TABLET | Refills: 0 | Status: SHIPPED | OUTPATIENT
Start: 2025-03-05 | End: 2025-03-07 | Stop reason: SDUPTHER

## 2025-03-05 SDOH — ECONOMIC STABILITY: FOOD INSECURITY: WITHIN THE PAST 12 MONTHS, THE FOOD YOU BOUGHT JUST DIDN'T LAST AND YOU DIDN'T HAVE MONEY TO GET MORE.: NEVER TRUE

## 2025-03-05 SDOH — ECONOMIC STABILITY: INCOME INSECURITY: IN THE LAST 12 MONTHS, WAS THERE A TIME WHEN YOU WERE NOT ABLE TO PAY THE MORTGAGE OR RENT ON TIME?: NO

## 2025-03-05 SDOH — ECONOMIC STABILITY: FOOD INSECURITY: WITHIN THE PAST 12 MONTHS, YOU WORRIED THAT YOUR FOOD WOULD RUN OUT BEFORE YOU GOT MONEY TO BUY MORE.: NEVER TRUE

## 2025-03-05 SDOH — ECONOMIC STABILITY: TRANSPORTATION INSECURITY
IN THE PAST 12 MONTHS, HAS THE LACK OF TRANSPORTATION KEPT YOU FROM MEDICAL APPOINTMENTS OR FROM GETTING MEDICATIONS?: NO

## 2025-03-07 ENCOUNTER — TELEPHONE (OUTPATIENT)
Dept: FAMILY MEDICINE CLINIC | Age: 50
End: 2025-03-07

## 2025-03-07 DIAGNOSIS — F41.9 ANXIETY: ICD-10-CM

## 2025-03-07 RX ORDER — ALPRAZOLAM 0.5 MG
0.5 TABLET ORAL 3 TIMES DAILY PRN
Qty: 90 TABLET | Refills: 0 | Status: SHIPPED | OUTPATIENT
Start: 2025-03-07 | End: 2025-04-06

## 2025-03-07 NOTE — TELEPHONE ENCOUNTER
Patient called stating her refill of Alprazolam 0.5 was sent to the wrong pharmacy.  Should have gone to [address] [id number]

## 2025-04-04 RX ORDER — SERTRALINE HYDROCHLORIDE 100 MG/1
150 TABLET, FILM COATED ORAL DAILY
Qty: 45 TABLET | Refills: 3 | Status: SHIPPED | OUTPATIENT
Start: 2025-04-04

## 2025-05-27 DIAGNOSIS — F41.9 ANXIETY: ICD-10-CM

## 2025-05-27 RX ORDER — ALPRAZOLAM 0.5 MG
TABLET ORAL
Qty: 90 TABLET | Refills: 0 | Status: SHIPPED | OUTPATIENT
Start: 2025-05-27 | End: 2025-06-26

## 2025-05-27 NOTE — TELEPHONE ENCOUNTER
Last office visit 3/5/2025       Next office visit scheduled Visit date not found    Requested Prescriptions     Pending Prescriptions Disp Refills    ALPRAZolam (XANAX) 0.5 MG tablet [Pharmacy Med Name: ALPRAZOLAM 0.5MG TABLETS] 90 tablet      Sig: TAKE 1 TABLET BY MOUTH THREE TIMES DAILY AS NEEDED FOR ANXIETY. MAX DAILY AMOUNT: 1.5 MG

## 2025-06-23 NOTE — PROGRESS NOTES
Subjective:      Patient ID: Winifred Christianson is a 50 y.o. female.    HPI    Depression: Patient's   by choking on food in .  She is tolerating and compliant with  Zoloft 150 mg daily.   She saw a counselor once weekly (but not since the  holidays).  She feels that the medication is helping with her symptoms.  She is sleeping fair and denies any suicidal ideation.  She feels that she still needs to be on the medication.          Anxiety:  Patient takes Xanax 0.5 mg every 8 hrs as needed.  She feels that the medication helps to control her anxiety and she would like to decrease the dose further.        Review of Systems   Constitutional:  Negative for chills and fever.   Psychiatric/Behavioral:  Positive for dysphoric mood. Negative for suicidal ideas. The patient is nervous/anxious.      /76   Ht 1.676 m (5' 6\")   Wt 56.2 kg (124 lb)   BMI 20.01 kg/m²    Objective:   Physical Exam  Constitutional:       General: She is not in acute distress.     Appearance: She is well-developed.   HENT:      Head: Normocephalic.      Right Ear: External ear normal.      Left Ear: External ear normal.      Mouth/Throat:      Pharynx: No oropharyngeal exudate.   Neck:      Thyroid: No thyromegaly.      Vascular: No JVD.   Cardiovascular:      Rate and Rhythm: Normal rate and regular rhythm.      Heart sounds: Normal heart sounds. No murmur heard.  Pulmonary:      Effort: Pulmonary effort is normal.      Breath sounds: Normal breath sounds. No wheezing or rales.   Lymphadenopathy:      Cervical: No cervical adenopathy.   Neurological:      Mental Status: She is alert and oriented to person, place, and time.         Assessment:      Depression   Anxiety       Plan:   Assessment & Plan   [facility] report was reviewed  Medications refilled   Decrease the Xanax to 0.25 mg TID prn.    Prevnar 20 Shot Given   Shingrix Shot Given   Patient had a GYN exam 10/2024.   Reminded patient to do the Cologuard test

## 2025-06-24 ENCOUNTER — OFFICE VISIT (OUTPATIENT)
Dept: FAMILY MEDICINE CLINIC | Age: 50
End: 2025-06-24

## 2025-06-24 VITALS
HEIGHT: 66 IN | BODY MASS INDEX: 19.93 KG/M2 | WEIGHT: 124 LBS | SYSTOLIC BLOOD PRESSURE: 102 MMHG | DIASTOLIC BLOOD PRESSURE: 76 MMHG

## 2025-06-24 DIAGNOSIS — F41.9 ANXIETY: ICD-10-CM

## 2025-06-24 DIAGNOSIS — Z23 NEED FOR PNEUMOCOCCAL 20-VALENT CONJUGATE VACCINATION: ICD-10-CM

## 2025-06-24 DIAGNOSIS — Z23 NEED FOR SHINGLES VACCINE: ICD-10-CM

## 2025-06-24 DIAGNOSIS — F32.4 MAJOR DEPRESSION SINGLE EPISODE, IN PARTIAL REMISSION: Primary | ICD-10-CM

## 2025-06-24 RX ORDER — ALPRAZOLAM 0.25 MG
0.25 TABLET ORAL 3 TIMES DAILY PRN
Qty: 90 TABLET | Refills: 0 | Status: SHIPPED | OUTPATIENT
Start: 2025-06-24 | End: 2025-07-24

## 2025-06-24 ASSESSMENT — PATIENT HEALTH QUESTIONNAIRE - PHQ9
3. TROUBLE FALLING OR STAYING ASLEEP: NOT AT ALL
2. FEELING DOWN, DEPRESSED OR HOPELESS: NOT AT ALL
9. THOUGHTS THAT YOU WOULD BE BETTER OFF DEAD, OR OF HURTING YOURSELF: NOT AT ALL
6. FEELING BAD ABOUT YOURSELF - OR THAT YOU ARE A FAILURE OR HAVE LET YOURSELF OR YOUR FAMILY DOWN: NOT AT ALL
5. POOR APPETITE OR OVEREATING: NOT AT ALL
5. POOR APPETITE OR OVEREATING: NOT AT ALL
9. THOUGHTS THAT YOU WOULD BE BETTER OFF DEAD, OR OF HURTING YOURSELF: NOT AT ALL
10. IF YOU CHECKED OFF ANY PROBLEMS, HOW DIFFICULT HAVE THESE PROBLEMS MADE IT FOR YOU TO DO YOUR WORK, TAKE CARE OF THINGS AT HOME, OR GET ALONG WITH OTHER PEOPLE: NOT DIFFICULT AT ALL
1. LITTLE INTEREST OR PLEASURE IN DOING THINGS: NOT AT ALL
10. IF YOU CHECKED OFF ANY PROBLEMS, HOW DIFFICULT HAVE THESE PROBLEMS MADE IT FOR YOU TO DO YOUR WORK, TAKE CARE OF THINGS AT HOME, OR GET ALONG WITH OTHER PEOPLE: NOT DIFFICULT AT ALL
4. FEELING TIRED OR HAVING LITTLE ENERGY: NOT AT ALL
SUM OF ALL RESPONSES TO PHQ QUESTIONS 1-9: 0
SUM OF ALL RESPONSES TO PHQ QUESTIONS 1-9: 0
8. MOVING OR SPEAKING SO SLOWLY THAT OTHER PEOPLE COULD HAVE NOTICED. OR THE OPPOSITE, BEING SO FIGETY OR RESTLESS THAT YOU HAVE BEEN MOVING AROUND A LOT MORE THAN USUAL: NOT AT ALL
7. TROUBLE CONCENTRATING ON THINGS, SUCH AS READING THE NEWSPAPER OR WATCHING TELEVISION: NOT AT ALL
3. TROUBLE FALLING OR STAYING ASLEEP: NOT AT ALL
SUM OF ALL RESPONSES TO PHQ QUESTIONS 1-9: 0
1. LITTLE INTEREST OR PLEASURE IN DOING THINGS: NOT AT ALL
6. FEELING BAD ABOUT YOURSELF - OR THAT YOU ARE A FAILURE OR HAVE LET YOURSELF OR YOUR FAMILY DOWN: NOT AT ALL
SUM OF ALL RESPONSES TO PHQ QUESTIONS 1-9: 0
SUM OF ALL RESPONSES TO PHQ QUESTIONS 1-9: 0
2. FEELING DOWN, DEPRESSED OR HOPELESS: NOT AT ALL
4. FEELING TIRED OR HAVING LITTLE ENERGY: NOT AT ALL
8. MOVING OR SPEAKING SO SLOWLY THAT OTHER PEOPLE COULD HAVE NOTICED. OR THE OPPOSITE - BEING SO FIDGETY OR RESTLESS THAT YOU HAVE BEEN MOVING AROUND A LOT MORE THAN USUAL: NOT AT ALL
7. TROUBLE CONCENTRATING ON THINGS, SUCH AS READING THE NEWSPAPER OR WATCHING TELEVISION: NOT AT ALL

## 2025-08-28 DIAGNOSIS — F41.9 ANXIETY: ICD-10-CM

## 2025-08-28 RX ORDER — ALPRAZOLAM 0.25 MG
TABLET ORAL
Qty: 90 TABLET | Refills: 0 | Status: SHIPPED | OUTPATIENT
Start: 2025-08-28 | End: 2025-09-27